# Patient Record
Sex: FEMALE | Race: BLACK OR AFRICAN AMERICAN | Employment: FULL TIME | URBAN - METROPOLITAN AREA
[De-identification: names, ages, dates, MRNs, and addresses within clinical notes are randomized per-mention and may not be internally consistent; named-entity substitution may affect disease eponyms.]

---

## 2017-02-13 ENCOUNTER — HOSPITAL ENCOUNTER (OUTPATIENT)
Dept: MAMMOGRAPHY | Age: 52
Discharge: HOME | End: 2017-02-13
Attending: OBSTETRICS & GYNECOLOGY
Payer: COMMERCIAL

## 2017-02-13 DIAGNOSIS — Z12.31 VISIT FOR SCREENING MAMMOGRAM: ICD-10-CM

## 2017-02-13 PROCEDURE — 77063 BREAST TOMOSYNTHESIS BI: CPT

## 2017-05-26 ENCOUNTER — HOSPITAL ENCOUNTER (OUTPATIENT)
Dept: PHYSICAL THERAPY | Age: 52
Discharge: HOME | End: 2017-05-26
Payer: COMMERCIAL

## 2017-05-26 PROCEDURE — 97110 THERAPEUTIC EXERCISES: CPT

## 2017-05-26 PROCEDURE — 97162 PT EVAL MOD COMPLEX 30 MIN: CPT
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
## 2017-06-01 ENCOUNTER — HOSPITAL ENCOUNTER (OUTPATIENT)
Dept: PHYSICAL THERAPY | Age: 52
Discharge: HOME | End: 2017-06-01
Payer: COMMERCIAL

## 2017-06-01 PROCEDURE — 97110 THERAPEUTIC EXERCISES: CPT

## 2017-06-01 PROCEDURE — 97112 NEUROMUSCULAR REEDUCATION: CPT

## 2017-06-01 NOTE — PROGRESS NOTES
PT DAILY TREATMENT NOTE 8-    Patient Name: Charline Hi  Date:2017  : 1965  [x]  Patient  Verified  Payor: Adam Streeter / Plan: Akhil Bonilla / Product Type: HMO /    In time: 4:22pm  Out time:5:06pm  Total Treatment Time (min): 44  Visit #: 2 of 8    Treatment Area: Low back pain [M54.5]    SUBJECTIVE  Pain Level (0-10 scale): 0 \"stiff\"  Any medication changes, allergies to medications, adverse drug reactions, diagnosis change, or new procedure performed?: [x] No    [] Yes (see summary sheet for update)  Subjective functional status/changes:   [] No changes reported  \"I feel better. I was doing those exercises and I could feel it loosen up the more I did it. I'm a little stiff now, but I just got off work. \"    OBJECTIVE  34 min Therapeutic Exercise:  [x] See flow sheet :   Rationale: increase ROM and increase strength to improve the patients ability to improve ease of ADLs. 10 min Neuromuscular Re-education:  [x]  See flow sheet :   Rationale: increase strength, improve coordination and increase proprioception  to improve the patients ability to improve core stability during daily activity. With   [] TE   [] TA   [] neuro   [] other: Patient Education: [x] Review HEP    [] Progressed/Changed HEP based on:   [] positioning   [] body mechanics   [] transfers   [] heat/ice application    [] other:      Other Objective/Functional Measures: requires initial tactile cues for PPT and cues to reduce valsalva maneuver, fair carryover post cueing     Pain Level (0-10 scale) post treatment: 0    ASSESSMENT/Changes in Function: Pt progressing reporting reduced pain since IE. Demonstrates fair TA activation post initial cueing. Reports fatigue with interventions. Continue per POC.     Patient will continue to benefit from skilled PT services to modify and progress therapeutic interventions, address functional mobility deficits, address ROM deficits, address strength deficits, analyze and address soft tissue restrictions, analyze and cue movement patterns, analyze and modify body mechanics/ergonomics and assess and modify postural abnormalities to attain remaining goals. []  See Plan of Care  []  See progress note/recertification  []  See Discharge Summary         Progress towards goals / Updated goals:  Short Term Goals: To be accomplished in 2 weeks:  1. Pt will be compliant with hourly motion program for 48 hours to alleviate fear/avoidance behavior and increase general mobility  2. Pt will be able to reach her toes from a sitting position to increase ease of bathing and dressing      Long Term Goals: To be accomplished in 4 weeks:  1. Pt will improve FOTO to 50 to increase ease of ADLs  2. Pt will demonstrate AROM of lumbar spine without pain to restore mobility for ADLs  3. Pt will report being able to reach and tie her shoes without fear or lumbar pain to increase ease of ADLs  4.  Pt will be able to walk around her workplace without limitations in endurance to increase ease of ADLs       PLAN  [x]  Upgrade activities as tolerated     [x]  Continue plan of care  []  Update interventions per flow sheet       []  Discharge due to:_  []  Other:_      Chris Randhawa, PT, DPT, ATC, CSCS 6/1/2017  4:31 PM

## 2017-06-02 ENCOUNTER — HOSPITAL ENCOUNTER (OUTPATIENT)
Dept: PHYSICAL THERAPY | Age: 52
Discharge: HOME | End: 2017-06-02
Payer: COMMERCIAL

## 2017-06-02 PROCEDURE — 97110 THERAPEUTIC EXERCISES: CPT

## 2017-06-02 PROCEDURE — 97112 NEUROMUSCULAR REEDUCATION: CPT

## 2017-06-02 NOTE — PROGRESS NOTES
PT DAILY TREATMENT NOTE     Patient Name: Kim Valentin  Date:2017  : 1965  [x]  Patient  Verified  Payor: Jaime Quevedo / Plan: Ephriam Haw / Product Type: HMO /    In time: 4:38pm  Out time:5:30pm  Total Treatment Time (min): 52  Visit #: 3 of 8    Treatment Area: Low back pain [M54.5]    SUBJECTIVE  Pain Level (0-10 scale): 2  Any medication changes, allergies to medications, adverse drug reactions, diagnosis change, or new procedure performed?: [x] No    [] Yes (see summary sheet for update)  Subjective functional status/changes:   [] No changes reported  Pt reports she was sore this morning, but did some exercises and that helped. She reports stiffness upon arrival, but attributes that to sitting at work all day. OBJECTIVE    32 min Therapeutic Exercise:  [x] See flow sheet :   Rationale: increase ROM and increase strength to improve the patients ability to improve ease of ADLs. 10 min Neuromuscular Re-education:  [x]  See flow sheet :   Rationale: increase strength, improve coordination and increase proprioception  to improve the patients ability to improve core stability during daily activity. Modality rationale: decrease pain and increase tissue extensibility to improve the patients ability to improve ADL ease.    Min Type Additional Details    [] Estim:  []Unatt       []IFC  []Premod                        []Other:  []w/ice   []w/heat  Position:  Location:    [] Estim: []Att    []TENS instruct  []NMES                    []Other:  []w/US   []w/ice   []w/heat  Position:  Location:    []  Traction: [] Cervical       []Lumbar                       [] Prone          []Supine                       []Intermittent   []Continuous Lbs:  [] before manual  [] after manual    []  Ultrasound: []Continuous   [] Pulsed                           []1MHz   []3MHz Location:  W/cm2:    []  Iontophoresis with dexamethasone         Location: [] Take home patch   [] In clinic   10 []  Ice     [x] heat  []  Ice massage  []  Laser   []  Anodyne Position: prone  Location:L/S    []  Laser with stim  []  Other: Position:  Location:    []  Vasopneumatic Device Pressure:       [] lo [] med [] hi   Temperature: [] lo [] med [] hi   [] Skin assessment post-treatment:  []intact []redness- no adverse reaction    []redness - adverse reaction:             With   [] TE   [] TA   [] neuro   [] other: Patient Education: [x] Review HEP    [] Progressed/Changed HEP based on:   [] positioning   [] body mechanics   [] transfers   [] heat/ice application    [x] other: educated pt on frequent positional changes and change of position from constant sitting posture      Other Objective/Functional Measures:      Pain Level (0-10 scale) post treatment: 0    ASSESSMENT/Changes in Function: Pt reports reducing pain and demonstrates grossly improved ease of thoracolumbar mobility demonstrating near ability to reach toes with trunk flexion in seated. Continue per POC with gradual progression into core stability and functional activity as tolerated. Patient will continue to benefit from skilled PT services to modify and progress therapeutic interventions, address functional mobility deficits, address ROM deficits, address strength deficits, analyze and address soft tissue restrictions, analyze and cue movement patterns and analyze and modify body mechanics/ergonomics to attain remaining goals. []  See Plan of Care  []  See progress note/recertification  []  See Discharge Summary         Progress towards goals / Updated goals:  Short Term Goals: To be accomplished in 2 weeks:  1. Pt will be compliant with hourly motion program for 48 hours to alleviate fear/avoidance behavior and increase general mobility Pt reports occasional performance, but limited by work 6/2/2017  2. Pt will be able to reach her toes from a sitting position to increase ease of bathing and dressing progressing 6/2/2017      Long Term Goals:  To be accomplished in 4 weeks:  1. Pt will improve FOTO to 50 to increase ease of ADLs  2. Pt will demonstrate AROM of lumbar spine without pain to restore mobility for ADLs  3. Pt will report being able to reach and tie her shoes without fear or lumbar pain to increase ease of ADLs  4.  Pt will be able to walk around her workplace without limitations in endurance to increase ease of ADLs    PLAN  [x]  Upgrade activities as tolerated     [x]  Continue plan of care  [x]  Update interventions per flow sheet       []  Discharge due to:_  []  Other:_      Chris Randhawa, PT, DPT, ATC, CSCS 6/2/2017  4:44 PM

## 2017-06-06 ENCOUNTER — HOSPITAL ENCOUNTER (OUTPATIENT)
Dept: PHYSICAL THERAPY | Age: 52
Discharge: HOME | End: 2017-06-06
Payer: COMMERCIAL

## 2017-06-06 PROCEDURE — 97110 THERAPEUTIC EXERCISES: CPT

## 2017-06-06 PROCEDURE — 97112 NEUROMUSCULAR REEDUCATION: CPT

## 2017-06-06 NOTE — PROGRESS NOTES
PT DAILY TREATMENT NOTE     Patient Name: Norma Jean  Date:2017  : 1965  [x]  Patient  Verified  Payor: Bryan Sigala / Plan: Dorena Hodgkins / Product Type: HMO /    In time:4:30  Out time:5:18  Total Treatment Time (min): 48  Visit #: 4 of 8    Treatment Area: Low back pain [M54.5]    SUBJECTIVE  Pain Level (0-10 scale): 0  Any medication changes, allergies to medications, adverse drug reactions, diagnosis change, or new procedure performed?: [x] No    [] Yes (see summary sheet for update)  Subjective functional status/changes:   [] No changes reported  Pt reports improved HEP tolerance and reduced pain today    OBJECTIVE    Modality rationale: increase tissue extensibility to improve the patients ability to improve functional mobility and ADL ease   Min Type Additional Details    [] Estim:  []Unatt       []IFC  []Premod                        []Other:  []w/ice   []w/heat  Position:  Location:    [] Estim: []Att    []TENS instruct  []NMES                    []Other:  []w/US   []w/ice   []w/heat  Position:  Location:    []  Traction: [] Cervical       []Lumbar                       [] Prone          []Supine                       []Intermittent   []Continuous Lbs:  [] before manual  [] after manual    []  Ultrasound: []Continuous   [] Pulsed                           []1MHz   []3MHz W/cm2:  Location:    []  Iontophoresis with dexamethasone         Location: [] Take home patch   [] In clinic   10 []  Ice     [x]  heat  []  Ice massage  []  Laser   []  Anodyne Position: prone  Location: L/S    []  Laser with stim  []  Other:  Position:  Location:    []  Vasopneumatic Device Pressure:       [] lo [] med [] hi   Temperature: [] lo [] med [] hi   [] Skin assessment post-treatment:  []intact []redness- no adverse reaction    []redness - adverse reaction:       23 min Therapeutic Exercise:  [] See flow sheet :   Rationale: increase ROM and increase strength to improve the patients ability to perform daily tasks and ADLs with increased ease     15 min Neuromuscular Re-education:  []  See flow sheet :   Rationale: improve coordination and increase proprioception  to improve the patients ability to activate core musculature with daily tasks for reduced back pain            With   [] TE   [] TA   [] neuro   [] other: Patient Education: [x] Review HEP    [] Progressed/Changed HEP based on:   [] positioning   [] body mechanics   [] transfers   [] heat/ice application    [] other:       Other Objective/Functional Measures: pt reports improved tolerance to PPT today    Added supine TA activation interventions today with good tolerance, report she can feel abs and glutes activating     Pain Level (0-10 scale) post treatment: 0    ASSESSMENT/Changes in Function: pt reports improved tolerance to exercise allowing her to perform with greater ease at home. Added core activation in supine today with good tolerance, pt reports good ab activation felt. Continue to progress as tolerated over remaining visits. Patient will continue to benefit from skilled PT services to modify and progress therapeutic interventions, address functional mobility deficits, address ROM deficits, address strength deficits, analyze and address soft tissue restrictions, analyze and cue movement patterns, analyze and modify body mechanics/ergonomics and assess and modify postural abnormalities to attain remaining goals. []  See Plan of Care  []  See progress note/recertification  []  See Discharge Summary         Progress towards goals / Updated goals:  Short Term Goals: To be accomplished in 2 weeks:  1. Pt will be compliant with hourly motion program for 48 hours to alleviate fear/avoidance behavior and increase general mobility Pt reports occasional performance, but limited by work 6/2/2017; pt reports improved consistency 6/6/17  2.  Pt will be able to reach her toes from a sitting position to increase ease of bathing and dressing progressing 6/2/2017; a couple of inches from toes 6/6/17      Long Term Goals: To be accomplished in 4 weeks:  1. Pt will improve FOTO to 50 to increase ease of ADLs  2. Pt will demonstrate AROM of lumbar spine without pain to restore mobility for ADLs  3. Pt will report being able to reach and tie her shoes without fear or lumbar pain to increase ease of ADLs  4.  Pt will be able to walk around her workplace without limitations in endurance to increase ease of ADLs    PLAN  [x]  Upgrade activities as tolerated     []  Continue plan of care  []  Update interventions per flow sheet       []  Discharge due to:_  []  Other:_      Josie Hancock, CHER 6/6/2017  4:40 PM    Future Appointments  Date Time Provider Jack Amin   6/8/2017 4:30 PM Liz Hunting, PT [facility]   6/13/2017 4:00 PM Liz Hunting, PT [facility]   6/16/2017 4:00 PM Liz Hunting, PT [facility]   6/20/2017 4:30 PM Liz Hunting, PT [facility]   6/22/2017 4:30 PM Liz Hunting, PT [facility]

## 2017-06-08 ENCOUNTER — HOSPITAL ENCOUNTER (OUTPATIENT)
Dept: PHYSICAL THERAPY | Age: 52
Discharge: HOME | End: 2017-06-08
Payer: COMMERCIAL

## 2017-06-08 PROCEDURE — 97112 NEUROMUSCULAR REEDUCATION: CPT

## 2017-06-08 PROCEDURE — 97110 THERAPEUTIC EXERCISES: CPT

## 2017-06-08 NOTE — PROGRESS NOTES
PT DAILY TREATMENT NOTE 3-16    Patient Name: Jair Valencia  Date:2017  : 1965  [x]  Patient  Verified  Payor: [place]ris [place] / Plan: [employer] / Product Type: HMO /    In time:  Out time:171  Total Treatment Time (min): 43  Visit #: 5 of 8    Treatment Area: Low back pain [M54.5]    SUBJECTIVE  Pain Level (0-10 scale): 0  Any medication changes, allergies to medications, adverse drug reactions, diagnosis change, or new procedure performed?: [x] No    [] Yes (see summary sheet for update)  Subjective functional status/changes:   [] No changes reported  \"Feels good. \"    OBJECTIVE     min []Eval                  []Re-Eval     10 min Therapeutic Exercise:  [x] See flow sheet :   Rationale: increase ROM and increase strength to improve the patients ability to perform ADLs    33 min Neuromuscular Re-education:  [x]  See flow sheet :   Rationale: increase strength, improve coordination and increase proprioception  to improve the patients ability to improve stability            With   [] TE   [] TA   [] neuro   [] other: Patient Education: [x] Review HEP    [] Progressed/Changed HEP based on:   [] positioning   [] body mechanics   [] transfers   [] heat/ice application    [] other:      Other Objective/Functional Measures: added head lift to SKC/DKC (with towel) with cueing for core and deep cervical flexor engagement     Pain Level (0-10 scale) post treatment: 0    ASSESSMENT/Changes in Function:   Pt reports feeling her core with all exercises today, and that she was sore afterward, but not in pain. Pt demonstrates excellent understanding of the difference between pain and soreness. Will continue to progress exercises as able, and pt understands how to manage soreness.     Patient will continue to benefit from skilled PT services to modify and progress therapeutic interventions, address functional mobility deficits, address ROM deficits, address strength deficits, analyze and address soft tissue restrictions, analyze and cue movement patterns, analyze and modify body mechanics/ergonomics, assess and modify postural abnormalities and instruct in home and community integration to attain remaining goals. []  See Plan of Care  []  See progress note/recertification  []  See Discharge Summary         Progress towards goals / Updated goals:  Short Term Goals: To be accomplished in 2 weeks:  1. Pt will be compliant with hourly motion program for 48 hours to alleviate fear/avoidance behavior and increase general mobility Pt reports occasional performance, but limited by work 6/2/2017; pt reports improved consistency 6/6/17  2. Pt will be able to reach her toes from a sitting position to increase ease of bathing and dressing progressing 6/2/2017; a couple of inches from toes 6/6/17      Long Term Goals: To be accomplished in 4 weeks:  1. Pt will improve FOTO to 50 to increase ease of ADLs  2. Pt will demonstrate AROM of lumbar spine without pain to restore mobility for ADLs  3. Pt will report being able to reach and tie her shoes without fear or lumbar pain to increase ease of ADLs  4.  Pt will be able to walk around her workplace without limitations in endurance to increase ease of ADLs    PLAN  [x]  Upgrade activities as tolerated     [x]  Continue plan of care  [x]  Update interventions per flow sheet       []  Discharge due to:_  []  Other:_      Wolf Keith PT 6/8/2017  4:40 PM    Future Appointments  Date Time Provider Jack Amin   6/13/2017 4:00 PM Wolf Keith PT [facility]   6/16/2017 4:00 PM Wolf Keith PT [facility]   6/20/2017 4:30 PM Wolf Keith PT [facility]   6/22/2017 4:30 PM Wolf Keith PT [facility]

## 2017-06-13 ENCOUNTER — HOSPITAL ENCOUNTER (OUTPATIENT)
Dept: PHYSICAL THERAPY | Age: 52
Discharge: HOME | End: 2017-06-13
Payer: COMMERCIAL

## 2017-06-13 PROCEDURE — 97112 NEUROMUSCULAR REEDUCATION: CPT

## 2017-06-13 PROCEDURE — 97110 THERAPEUTIC EXERCISES: CPT

## 2017-06-13 NOTE — PROGRESS NOTES
PT DAILY TREATMENT NOTE 3-16    Patient Name: Mauricio Dominguez  Date:2017  : 1965  [x]  Patient  Verified  Payor: Yandel Otto / Plan: Seth Shape / Product Type: HMO /    In time:1600  Out time:1700  Total Treatment Time (min): 60 (minus 10 minutes down time = 50 minutes)  Visit #: 6 of 8    Treatment Area: Low back pain [M54.5]    SUBJECTIVE  Pain Level (0-10 scale): 0  Any medication changes, allergies to medications, adverse drug reactions, diagnosis change, or new procedure performed?: [x] No    [] Yes (see summary sheet for update)  Subjective functional status/changes:   [] No changes reported  Pt reports feeling great after last visit, with no lasting soreness. She has been practicing her exercises at home. OBJECTIVE     min []Eval                  []Re-Eval     25 min Therapeutic Exercise:  [x] See flow sheet :   Rationale: increase ROM and increase strength to improve the patients ability to perform ADLs    25 min Neuromuscular Re-education:  [x]  See flow sheet :   Rationale: increase strength, improve coordination and increase proprioception  to improve the patients ability to improve core engagement            With   [] TE   [] TA   [] neuro   [] other: Patient Education: [x] Review HEP    [] Progressed/Changed HEP based on:   [] positioning   [] body mechanics   [] transfers   [] heat/ice application    [] other:      Other Objective/Functional Measures:   HEP - every two hours  FOTO 79     Pain Level (0-10 scale) post treatment: 0    ASSESSMENT/Changes in Function:   Pt demonstrates improved FOTO to 79/100, and improved mobility and functional task completion. She reports continued fatigue and soreness with core stability exercises, however able to perform more reps at a time before taking a rest break. Will progress toward standing stability exercises to improve lifting and stair negotiation.     Patient will continue to benefit from skilled PT services to modify and progress therapeutic interventions, address functional mobility deficits, address ROM deficits, address strength deficits, analyze and address soft tissue restrictions, analyze and cue movement patterns, analyze and modify body mechanics/ergonomics, assess and modify postural abnormalities and instruct in home and community integration to attain remaining goals. []  See Plan of Care  []  See progress note/recertification  []  See Discharge Summary         Progress towards goals / Updated goals:  Short Term Goals: To be accomplished in 2 weeks:  1. Pt will be compliant with hourly motion program for 48 hours to alleviate fear/avoidance behavior and increase general mobility Pt reports occasional performance, but limited by work 6/2/2017; pt reports improved consistency 6/6/17; met for every two hours based on work schedule 06/13/2017  2. Pt will be able to reach her toes from a sitting position to increase ease of bathing and dressing progressing 6/2/2017; a couple of inches from toes 6/6/17      Long Term Goals: To be accomplished in 4 weeks:  1. Pt will improve FOTO to 50 to increase ease of ADLs. Met at 79/100 06/13/2017  2. Pt will demonstrate AROM of lumbar spine without pain to restore mobility for ADLs  3. Pt will report being able to reach and tie her shoes without fear or lumbar pain to increase ease of ADLs  4.  Pt will be able to walk around her workplace without limitations in endurance to increase ease of ADLs    PLAN  [x]  Upgrade activities as tolerated     [x]  Continue plan of care  [x]  Update interventions per flow sheet       []  Discharge due to:_  []  Other:_      Leslie Aparicio PT 6/13/2017  3:47 PM    Future Appointments  Date Time Provider Jack Amin   6/13/2017 4:00 PM Leslie Aparicio PT [facility]   6/16/2017 4:00 PM Leslie Aparicio PT [facility]   6/20/2017 4:30 PM Leslie Aparicio PT [facility]   6/22/2017 4:30 PM Leslie Aparicio PT [facility]

## 2017-06-16 ENCOUNTER — APPOINTMENT (OUTPATIENT)
Dept: PHYSICAL THERAPY | Age: 52
End: 2017-06-16
Payer: COMMERCIAL

## 2017-06-20 ENCOUNTER — HOSPITAL ENCOUNTER (OUTPATIENT)
Dept: PHYSICAL THERAPY | Age: 52
Discharge: HOME | End: 2017-06-20
Payer: COMMERCIAL

## 2017-06-20 PROCEDURE — 97112 NEUROMUSCULAR REEDUCATION: CPT

## 2017-06-20 PROCEDURE — 97110 THERAPEUTIC EXERCISES: CPT

## 2017-06-20 NOTE — PROGRESS NOTES
PT DAILY TREATMENT NOTE 3-16    Patient Name: Fanny Lopez  Date:2017  : 1965  [x]  Patient  Verified  Payor: Sandra Rm / Plan: Chandler Dakins / Product Type: HMO /    In time:  Out time:  Total Treatment Time (min): 44  Visit #: 7 of 8    Treatment Area: Low back pain [M54.5]    SUBJECTIVE  Pain Level (0-10 scale): 0  Any medication changes, allergies to medications, adverse drug reactions, diagnosis change, or new procedure performed?: [x] No    [] Yes (see summary sheet for update)  Subjective functional status/changes:   [] No changes reported  Pt reports hitting traffic last Friday, and that's why she missed her appointment. She reports forgetting her core stability exercises over the weekend. OBJECTIVE     min []Eval                  []Re-Eval     10 min Therapeutic Exercise:  [x] See flow sheet :   Rationale: increase ROM and increase strength to improve the patients ability to perform ADLs    34 min Neuromuscular Re-education:  [x]  See flow sheet :   Rationale: increase strength, improve coordination and increase proprioception  to improve the patients ability to improve core engagement. With   [] TE   [] TA   [] neuro   [] other: Patient Education: [x] Review HEP    [] Progressed/Changed HEP based on:   [] positioning   [] body mechanics   [] transfers   [] heat/ice application    [] other:      Other Objective/Functional Measures:   Reach and tie her shoes: no pain     Pain Level (0-10 scale) post treatment: 0    ASSESSMENT/Changes in Function:   Pt demonstrates much improved ability to perform core stability exercises, and challenged further with B UE movement with TA draw. Pt reports having no pain when performing work tasks. She is to assess her overall functional status and decide if we will perform continued PT after next visit or not.     Patient will continue to benefit from skilled PT services to modify and progress therapeutic interventions, address functional mobility deficits, address ROM deficits, address strength deficits, analyze and address soft tissue restrictions, analyze and cue movement patterns, analyze and modify body mechanics/ergonomics, assess and modify postural abnormalities and instruct in home and community integration to attain remaining goals. []  See Plan of Care  []  See progress note/recertification  []  See Discharge Summary         Progress towards goals / Updated goals:  Short Term Goals: To be accomplished in 2 weeks:  1. Pt will be compliant with hourly motion program for 48 hours to alleviate fear/avoidance behavior and increase general mobility Pt reports occasional performance, but limited by work 6/2/2017; pt reports improved consistency 6/6/17; met for every two hours based on work schedule 06/13/2017  2. Pt will be able to reach her toes from a sitting position to increase ease of bathing and dressing progressing 6/2/2017; a couple of inches from toes 6/6/17      Long Term Goals: To be accomplished in 4 weeks:  1. Pt will improve FOTO to 50 to increase ease of ADLs. Met at 79/100 06/13/2017  2. Pt will demonstrate AROM of lumbar spine without pain to restore mobility for ADLs  3. Pt will report being able to reach and tie her shoes without fear or lumbar pain to increase ease of ADLs. No pain 06/20/2017  4. Pt will be able to walk around her workplace without limitations in endurance to increase ease of ADLs.  Met 06/20/2017    PLAN  [x]  Upgrade activities as tolerated     [x]  Continue plan of care  [x]  Update interventions per flow sheet       []  Discharge due to:_  []  Other:_      Dane Keys PT 6/20/2017  4:58 PM    Future Appointments  Date Time Provider Jack Amin   6/22/2017 4:30 PM Dane Keys PT [facility]   6/28/2017 7:00 AM [facility]

## 2017-06-22 ENCOUNTER — HOSPITAL ENCOUNTER (OUTPATIENT)
Dept: PHYSICAL THERAPY | Age: 52
Discharge: HOME | End: 2017-06-22
Payer: COMMERCIAL

## 2017-06-22 PROCEDURE — 97112 NEUROMUSCULAR REEDUCATION: CPT

## 2017-06-22 PROCEDURE — 97110 THERAPEUTIC EXERCISES: CPT

## 2017-06-22 NOTE — PROGRESS NOTES
PT DAILY TREATMENT NOTE 3-16    Patient Name: Juan Meier  Date:2017  : 1965  [x]  Patient  Verified  Payor: Ramiro Castillo / Plan: Urban Son / Product Type: HMO /    In time:  Out time:171  Total Treatment Time (min): 55  Visit #: 8 of 8    Treatment Area: Low back pain [M54.5]    SUBJECTIVE  Pain Level (0-10 scale): 0  Any medication changes, allergies to medications, adverse drug reactions, diagnosis change, or new procedure performed?: [x] No    [] Yes (see summary sheet for update)  Subjective functional status/changes:   [] No changes reported  Pt reports ready to d/c. OBJECTIVE     min []Eval                  []Re-Eval     10 min Therapeutic Exercise:  [x] See flow sheet :   Rationale: increase ROM and increase strength to improve the patients ability to perform ADLs    36 min Neuromuscular Re-education:  [x]  See flow sheet :   Rationale: increase strength, improve coordination and increase proprioception  to improve the patients ability to improve core engagement            With   [] TE   [] TA   [] neuro   [] other: Patient Education: [x] Review HEP    [] Progressed/Changed HEP based on:   [] positioning   [] body mechanics   [] transfers   [] heat/ice application    [] other:      Other Objective/Functional Measures: full lumbar AROM in standing, back is not a restrictor for forward flex in sitting     Pain Level (0-10 scale) post treatment: 0    ASSESSMENT/Changes in Function:   Pt demonstrates excellent progress with PT. Pt demonstrates full lumbar AROM, and she is able to perform all core exercises at home. Updated HEP, and pt d/c to independent HEP at this time. []  See Plan of Care  []  See progress note/recertification  [x]  See Discharge Summary         Progress towards goals / Updated goals:  Short Term Goals: To be accomplished in 2 weeks:  1.  Pt will be compliant with hourly motion program for 48 hours to alleviate fear/avoidance behavior and increase general mobility Pt reports occasional performance, but limited by work 6/2/2017; pt reports improved consistency 6/6/17; met for every two hours based on work schedule 06/13/2017  2. Pt will be able to reach her toes from a sitting position to increase ease of bathing and dressing progressing 6/2/2017; a couple of inches from toes 6/6/17; unable to reach past mid shin, however the back is not the restricting factor 06/22/2017      Long Term Goals: To be accomplished in 4 weeks:  1. Pt will improve FOTO to 50 to increase ease of ADLs. Met at 79/100 06/13/2017  2. Pt will demonstrate AROM of lumbar spine without pain to restore mobility for ADLs. met 06/22/2017  3. Pt will report being able to reach and tie her shoes without fear or lumbar pain to increase ease of ADLs. No pain 06/20/2017  4. Pt will be able to walk around her workplace without limitations in endurance to increase ease of ADLs.  Met 06/20/2017    PLAN  []  Upgrade activities as tolerated     []  Continue plan of care  []  Update interventions per flow sheet       [x]  Discharge due to: completion of program.  []  Other:_      Trey Campa PT 6/22/2017  4:55 PM    Future Appointments  Date Time Provider Jack Amin   6/28/2017 7:00 AM Trey Campa PT [facility]

## 2017-06-22 NOTE — PROGRESS NOTES
[facility]  [address] [facility] [address]  [place], [address]  [phone] fax    Physical Therapy Discharge Summary  Patient name: Stephanie Jeter Start of Care: 2017   Referral source: Yee Gilliam MD : 1965                          Medical Diagnosis: Low back pain [M54.5] Onset Date:2017                          Treatment Diagnosis: dorsal lumbar strain   Prior Hospitalization: see medical history Provider#: [id number]   Medications: Verified on Patient summary List    Comorbidities: back pain, obesity, HA, HTN   Prior Level of Function: Full ROM, no muscle guarding or limitations to sitting/standing tolerance    Visits from Start of Care: 8    Missed Visits: 1 cx due to weather  Reporting Period : 2017 to 2017      Summary of Care:  Short Term Goals: To be accomplished in 2 weeks:  1. Pt will be compliant with hourly motion program for 48 hours to alleviate fear/avoidance behavior and increase general mobility Pt reports occasional performance, but limited by work 2017; pt reports improved consistency 17; met for every two hours based on work schedule 2017  2. Pt will be able to reach her toes from a sitting position to increase ease of bathing and dressing progressing 2017; a couple of inches from toes 17; unable to reach past mid shin, however the back is not the restricting factor 2017      Long Term Goals: To be accomplished in 4 weeks:  1. Pt will improve FOTO to 50 to increase ease of ADLs. Met at 79/100 2017  2. Pt will demonstrate AROM of lumbar spine without pain to restore mobility for ADLs. met 2017  3. Pt will report being able to reach and tie her shoes without fear or lumbar pain to increase ease of ADLs. No pain 2017  4. Pt will be able to walk around her workplace without limitations in endurance to increase ease of ADLs.  Met 2017      ASSESSMENT/RECOMMENDATIONS: Pt demonstrates excellent progress with PT. Pt demonstrates full lumbar AROM, and she is able to perform all core exercises at home. Updated HEP, and pt d/c to independent HEP at this time.   [x]Discontinue therapy: [x]Patient has reached or is progressing toward set goals      []Patient is non-compliant or has abdicated      []Due to lack of appreciable progress towards set goals    Liz Mitchell PT 6/22/2017 5:33 PM

## 2017-06-28 ENCOUNTER — APPOINTMENT (OUTPATIENT)
Dept: PHYSICAL THERAPY | Age: 52
End: 2017-06-28
Payer: COMMERCIAL

## 2018-04-20 ENCOUNTER — OFFICE VISIT (OUTPATIENT)
Dept: FAMILY MEDICINE CLINIC | Age: 53
End: 2018-04-20

## 2018-04-20 VITALS
WEIGHT: 209 LBS | SYSTOLIC BLOOD PRESSURE: 143 MMHG | HEIGHT: 61 IN | OXYGEN SATURATION: 99 % | BODY MASS INDEX: 39.46 KG/M2 | TEMPERATURE: 97.1 F | HEART RATE: 91 BPM | RESPIRATION RATE: 18 BRPM | DIASTOLIC BLOOD PRESSURE: 92 MMHG

## 2018-04-20 DIAGNOSIS — Z12.11 COLON CANCER SCREENING: ICD-10-CM

## 2018-04-20 DIAGNOSIS — Z83.3 FAMILY HISTORY OF DIABETES MELLITUS (DM): ICD-10-CM

## 2018-04-20 DIAGNOSIS — E66.01 MORBID OBESITY (HCC): ICD-10-CM

## 2018-04-20 DIAGNOSIS — I10 ESSENTIAL HYPERTENSION: ICD-10-CM

## 2018-04-20 DIAGNOSIS — I10 ESSENTIAL HYPERTENSION: Primary | ICD-10-CM

## 2018-04-20 RX ORDER — AMLODIPINE BESYLATE 5 MG/1
5 TABLET ORAL DAILY
Qty: 30 TAB | Refills: 2 | Status: SHIPPED | OUTPATIENT
Start: 2018-04-20 | End: 2018-06-06 | Stop reason: SDUPTHER

## 2018-04-20 RX ORDER — CHOLECALCIFEROL (VITAMIN D3) 125 MCG
1 CAPSULE ORAL DAILY
COMMUNITY
End: 2019-05-22 | Stop reason: DRUGHIGH

## 2018-04-20 NOTE — PATIENT INSTRUCTIONS
Return to give blood after fasting 8 hours. No food or coffee. Water and medicines are okay. Lab opens from 8:30AM to 4:30PM Monday through Friday. No need to schedule an appointment. Bring stool blood test for colon cancer screening back when you get blood work done        FOR HIGH BLOOD PRESSURE:  Start taking amlodipine 5 mg daily in morning  Check blood pressure mid-day 2-3  x per week and write down  Make sure you are seated for at least  5-10 minutes, legs uncrossed and back resting  Bring log to clinic next visit  Max salt per day 2 grams (2000 mg)  Work on exercise 30 minutes a day 3 times per week    FOR WEIGHT:  Exercise per above  Try to eat 3 meals a day every day, healthy protien healthy fat and healthy carbohydrate with each meal, diet recs below  Try to drink at least 2 liters of water per day           Learning About Low-Carbohydrate Diets for Weight Loss  What is a low-carbohydrate diet? Low-carb diets avoid foods that are high in carbohydrate. These high-carb foods include pasta, bread, rice, cereal, fruits, and starchy vegetables. Instead, these diets usually have you eat foods that are high in fat and protein. Many people lose weight quickly on a low-carb diet. But the early weight loss is water. People on this diet often gain the weight back after they start eating carbs again. Not all diet plans are safe or work well. A lot of the evidence shows that low-carb diets aren't healthy. That's because these diets often don't include healthy foods like fruits and vegetables. Losing weight safely means balancing protein, fat, and carbs with every meal and snack. And low-carb diets don't always provide the vitamins, minerals, and fiber you need. If you have a serious medical condition, talk to your doctor before you try any diet. These conditions include kidney disease, heart disease, type 2 diabetes, high cholesterol, and high blood pressure.   If you are pregnant, it may not be safe for your baby if you are on a low-carb diet. How can you lose weight safely? You might have heard that a diet plan helped another person lose weight. But that doesn't mean that it will work for you. It is very hard to stay on a diet that includes lots of big changes in your eating habits. If you want to get to a healthy weight and stay there, making healthy lifestyle changes will often work better than dieting. These steps can help. · Make a plan for change. Work with your doctor to create a plan that is right for you. · See a dietitian. He or she can show you how to make healthy changes in your eating habits. · Manage stress. If you have a lot of stress in your life, it can be hard to focus on making healthy changes to your daily habits. · Track your food and activity. You are likely to do better at losing weight if you keep track of what you eat and what you do. Follow-up care is a key part of your treatment and safety. Be sure to make and go to all appointments, and call your doctor if you are having problems. It's also a good idea to know your test results and keep a list of the medicines you take. Where can you learn more? Go to http://mahesh-mindy.info/. Enter A121 in the search box to learn more about \"Learning About Low-Carbohydrate Diets for Weight Loss. \"  Current as of: May 12, 2017  Content Version: 11.4  © 2604-0201 [employer]. Care instructions adapted under license by [employer] (which disclaims liability or warranty for this information). If you have questions about a medical condition or this instruction, always ask your healthcare professional. [address] any warranty or liability for your use of this information. Body Mass Index: Care Instructions  Your Care Instructions    Body mass index (BMI) can help you see if your weight is raising your risk for health problems.  It uses a formula to compare how much you weigh with how tall you are. · A BMI lower than 18.5 is considered underweight. · A BMI between 18.5 and 24.9 is considered healthy. · A BMI between 25 and 29.9 is considered overweight. A BMI of 30 or higher is considered obese. If your BMI is in the normal range, it means that you have a lower risk for weight-related health problems. If your BMI is in the overweight or obese range, you may be at increased risk for weight-related health problems, such as high blood pressure, heart disease, stroke, arthritis or joint pain, and diabetes. If your BMI is in the underweight range, you may be at increased risk for health problems such as fatigue, lower protection (immunity) against illness, muscle loss, bone loss, hair loss, and hormone problems. BMI is just one measure of your risk for weight-related health problems. You may be at higher risk for health problems if you are not active, you eat an unhealthy diet, or you drink too much alcohol or use tobacco products. Follow-up care is a key part of your treatment and safety. Be sure to make and go to all appointments, and call your doctor if you are having problems. It's also a good idea to know your test results and keep a list of the medicines you take. How can you care for yourself at home? · Practice healthy eating habits. This includes eating plenty of fruits, vegetables, whole grains, lean protein, and low-fat dairy. · If your doctor recommends it, get more exercise. Walking is a good choice. Bit by bit, increase the amount you walk every day. Try for at least 30 minutes on most days of the week. · Do not smoke. Smoking can increase your risk for health problems. If you need help quitting, talk to your doctor about stop-smoking programs and medicines. These can increase your chances of quitting for good. · Limit alcohol to 2 drinks a day for men and 1 drink a day for women. Too much alcohol can cause health problems.   If you have a BMI higher than 25  · Your doctor may do other tests to check your risk for weight-related health problems. This may include measuring the distance around your waist. A waist measurement of more than 40 inches in men or 35 inches in women can increase the risk of weight-related health problems. · Talk with your doctor about steps you can take to stay healthy or improve your health. You may need to make lifestyle changes to lose weight and stay healthy, such as changing your diet and getting regular exercise. If you have a BMI lower than 18.5  · Your doctor may do other tests to check your risk for health problems. · Talk with your doctor about steps you can take to stay healthy or improve your health. You may need to make lifestyle changes to gain or maintain weight and stay healthy, such as getting more healthy foods in your diet and doing exercises to build muscle. Where can you learn more? Go to http://mahesh-mindy.info/. Enter S176 in the search box to learn more about \"Body Mass Index: Care Instructions. \"  Current as of: October 13, 2016  Content Version: 11.4  © 3309-2815 [employer]. Care instructions adapted under license by [employer] (which disclaims liability or warranty for this information). If you have questions about a medical condition or this instruction, always ask your healthcare professional. [address] any warranty or liability for your use of this information.

## 2018-04-20 NOTE — PROGRESS NOTES
INTERNAL MEDICINE INITIAL PROVIDER VISIT    SUBJECTIVE:     Chief Complaint   Patient presents with    Establish Care    Hypertension       HPI: 47 y.o. female with PMHx significant for HTN, obesity is here for the above chief complaint(s). Establish Care: [facility] last visit Jan 2018, seeing since last year. Last blood work last year. Hypertension: Today BP is uncontrolled. No past medicines. BP checks when \"feel that it's elevated\" or \"with a headache\" levels 160/94 highest. Denies headache, lightheadedness, dizziness, vision changes, chest pain, rapid/irregular heart rate, shortness of breath, cough, abdominal pain, leg swelling, leg pain. Is trying to watch salt in diet. Eating \"a lot of processed foods. \" Exericse 2x per week. Limited. Eating 3 meals a day. 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 2 grandchildren as well. Obesity: Exercising 2x per week for past year. Was exercising more, decreased sugar intake and exercising 4-5 x per week. ROS: 12 point ROS completed, positive per HPI    Current Outpatient Prescriptions   Medication Sig    MULTIVIT-MIN/FOLIC [id number] (ALIVE WOMEN'S GUMMY VITAMINS PO) Take  by mouth.  cholecalciferol, vitamin D3, (VITAMIN D3) 2,000 unit tab Take  by mouth.  amLODIPine (NORVASC) 5 mg tablet Take 1 Tab by mouth daily for 90 days. No current facility-administered medications for this visit.       Health Maintenance   Topic Date Due    DTaP/Tdap/Td series (1 - Tdap) 05/07/1987    Influenza Age 5 to Adult  08/01/2017    BREAST CANCER SCRN MAMMOGRAM  04/22/2018    FOBT Q 1 YEAR AGE 50-75  07/19/2018 (Originally 5/7/2016)    PAP AKA CERVICAL CYTOLOGY  04/01/2021       Medications and Allergies: Reviewed and confirmed in the chart    Past Medical Hx: Reviewed and confirmed in the chart    Patient Active Problem List   Diagnosis Code    Morbid obesity ([facility] [address] E66.01    Hypertension I10    Family history of diabetes mellitus (DM) Z83.3     Family Hx, Surgical Hx, Social Hx: Reviewed and updated in EMR    OBJECTIVE:  Vitals:    04/20/18 1114 04/20/18 1126   BP: (!) 167/94 (!) 143/92   Pulse: 91    Resp: 18    Temp: 97.1 °F (36.2 °C)    TempSrc: Oral    SpO2: 99%    Weight: 209 lb (94.8 kg)    Height: 5' 1\" (1.549 m)        BP Readings from Last 3 Encounters:   04/20/18 (!) 143/92   10/19/12 126/83     Wt Readings from Last 3 Encounters:   04/20/18 209 lb (94.8 kg)   10/19/12 181 lb 3.5 oz (82.2 kg)       General: Pleasant adult middle aged woman sitting comfortably in no acute distress  HEENT: Head is atraumatic normo-cephalic. CVS:Heart regular, rate, and rhythm. Audible S1 and S2. No murmurs, rubs or gallops. PULM: Lungs clear to auscultation bilaterally. No wheezes, rales or rhonchi. EXT: 2+ dorsalis pedis pulses bilaterally. No pedal edema bilaterally  Neuro: Alert and oriented x3. Gait wnl. MSE: mood euthymic, affect congruent and reactive. Nursing Notes Reviewed    ASSESSMENT AND PLAN    ICD-10-CM ICD-9-CM    1. Essential hypertension I10 401.9 LIPID PANEL      HEMOGLOBIN A1C WITH EAG      METABOLIC PANEL, COMPREHENSIVE      TSH 3RD GENERATION      CBC WITH AUTOMATED DIFF      amLODIPine (NORVASC) 5 mg tablet  BP LOG  EXERCISE  LOW SALT DIET   2. Morbid obesity ([address] E66.01 278.01 LIPID PANEL      HEMOGLOBIN A1C WITH EAG      METABOLIC PANEL, COMPREHENSIVE      TSH 3RD GENERATION      CBC WITH AUTOMATED DIFF  DIET AND EXERCISE DISCUSSED  BMI Handout given   3. Family history of diabetes mellitus (DM) Z83.3 V18.0 HEMOGLOBIN A1C WITH EAG   4.  Colon cancer screening Z12.11 V76.51 OCCULT BLOOD, IMMUNOASSAY (FIT)       Orders Placed This Encounter    LIPID PANEL    HEMOGLOBIN A1C WITH EAG    METABOLIC PANEL, COMPREHENSIVE    TSH 3RD GENERATION    CBC WITH AUTOMATED DIFF    OCCULT BLOOD, IMMUNOASSAY (FIT)    MULTIVIT-MIN/FOLIC UCAB/[id number] (ALIVE WOMEN'S GUMMY VITAMINS PO)    cholecalciferol, vitamin D3, (VITAMIN D3) 2,000 unit tab    amLODIPine (NORVASC) 5 mg tablet       Future Appointments  Date Time Provider Department Center   5/18/2018 2:30 PM Hernandez Fernandez MD [address] [place] [address] printed and provided to patient    Assessment and plan above discussed with patient, patient voiced understanding and agreement with plan. More than 50% of this 30 min visit was spent face to face counseling the patient about the etiology and treatment options for the above health conditions outlined in assessment and plan       Hernandez Fernandez M.D.   [address], [address] [place] [address] [phone]  [place] [phone]

## 2018-04-20 NOTE — PROGRESS NOTES
Chief Complaint   Patient presents with    Establish Care    Hypertension     1. Have you been to the ER, urgent care clinic since your last visit? Hospitalized since your last visit? Yes [facility]     2. Have you seen or consulted any other health care providers outside of the [address] [place] [address] since your last visit? Include any pap smears or colon screening.  Yes Where: [facility]

## 2018-04-20 NOTE — MR AVS SNAPSHOT
Arlyss Drought 
 
 
 [address] [facility] [address] [address] 
[phone] Patient: Paris Campbell MRN: [id number] [facility]:0/2/4597 Visit Information Date & Time Provider Department Dept. Phone Encounter #  
 4/20/2018 11:00 AM Nena Naranjo MD [address] [phone] [id number] Follow-up Instructions Return in about 4 weeks (around 5/18/2018), or if symptoms worsen or fail to improve, for HTN, Obesity. Upcoming Health Maintenance Date Due DTaP/Tdap/Td series (1 - Tdap) 5/7/1987 FOBT Q 1 YEAR AGE 50-75 5/7/2016 PAP AKA CERVICAL CYTOLOGY 6/3/2016 Influenza Age 5 to Adult 8/1/2017 BREAST CANCER SCRN MAMMOGRAM 2/13/2019 Allergies as of 4/20/2018  Review Complete On: 4/20/2018 By: Nena Naranjo MD  
  
 Severity Noted Reaction Type Reactions Citric Acid  04/20/2018    Rash Red Dye  04/20/2018    Hives Current Immunizations  Never Reviewed No immunizations on file. Not reviewed this visit You Were Diagnosed With   
  
 Codes Comments Essential hypertension    -  Primary ICD-10-CM: I10 
ICD-9-CM: 401.9 Morbid obesity ([facility] [address]     ICD-10-CM: E66.01 
ICD-9-CM: 278.01 Family history of diabetes mellitus (DM)     ICD-10-CM: Z83.3 ICD-9-CM: V18.0 Colon cancer screening     ICD-10-CM: Z12.11 ICD-9-CM: V76.51 Vitals BP Pulse Temp Resp Height(growth percentile) Weight(growth percentile) (!) 143/92 91 97.1 °F (36.2 °C) (Oral) 18 5' 1\" (1.549 m) 209 lb (94.8 kg) LMP SpO2 BMI OB Status Smoking Status 04/09/2018 99% 39.49 kg/m2 Having regular periods Never Smoker Vitals History BMI and BSA Data Body Mass Index Body Surface Area  
 39.49 kg/m 2 2.02 m 2 Preferred Pharmacy Pharmacy Name Phone [id number] [phone] Your Updated Medication List  
  
   
 This list is accurate as of 4/20/18 11:45 AM.  Always use your most recent med list.  
  
  
  
  
 ALIVE WOMEN'S GUMMY VITAMINS PO Take  by mouth. amLODIPine 5 mg tablet Commonly known as:  George Oneil Take 1 Tab by mouth daily for 90 days. VITAMIN D3 2,000 unit Tab Generic drug:  cholecalciferol (vitamin D3) Take  by mouth. Prescriptions Sent to Pharmacy Refills  
 amLODIPine (NORVASC) 5 mg tablet 2 Sig: Take 1 Tab by mouth daily for 90 days. Class: Normal  
 Pharmacy: [address] [place] [address] #: [phone] Route: Oral  
  
Follow-up Instructions Return in about 4 weeks (around 5/18/2018), or if symptoms worsen or fail to improve, for HTN, Obesity. To-Do List   
 04/20/2018 Lab:  CBC WITH AUTOMATED DIFF   
  
 04/20/2018 Lab:  HEMOGLOBIN A1C WITH EAG   
  
 04/20/2018 Lab:  LIPID PANEL   
  
 04/20/2018 Lab:  METABOLIC PANEL, COMPREHENSIVE   
  
 04/20/2018 Lab:  TSH 3RD GENERATION   
  
 05/20/2018 Lab:  OCCULT BLOOD, IMMUNOASSAY (FIT) Patient Instructions Return to give blood after fasting 8 hours. No food or coffee. Water and medicines are okay. Lab opens from 8:30AM to 4:30PM Monday through Friday. No need to schedule an appointment. Bring stool blood test for colon cancer screening back when you get blood work done FOR HIGH BLOOD PRESSURE: 
Start taking amlodipine 5 mg daily in morning Check blood pressure mid-day 2-3  x per week and write down Make sure you are seated for at least  5-10 minutes, legs uncrossed and back resting Bring log to clinic next visit Max salt per day 2 grams (2000 mg) Work on exercise 30 minutes a day 3 times per week FOR WEIGHT: 
Exercise per above Try to eat 3 meals a day every day, healthy protien healthy fat and healthy carbohydrate with each meal, diet recs below Try to drink at least 2 liters of water per day Learning About Low-Carbohydrate Diets for Weight Loss What is a low-carbohydrate diet? Low-carb diets avoid foods that are high in carbohydrate. These high-carb foods include pasta, bread, rice, cereal, fruits, and starchy vegetables. Instead, these diets usually have you eat foods that are high in fat and protein. Many people lose weight quickly on a low-carb diet. But the early weight loss is water. People on this diet often gain the weight back after they start eating carbs again. Not all diet plans are safe or work well. A lot of the evidence shows that low-carb diets aren't healthy. That's because these diets often don't include healthy foods like fruits and vegetables. Losing weight safely means balancing protein, fat, and carbs with every meal and snack. And low-carb diets don't always provide the vitamins, minerals, and fiber you need. If you have a serious medical condition, talk to your doctor before you try any diet. These conditions include kidney disease, heart disease, type 2 diabetes, high cholesterol, and high blood pressure. If you are pregnant, it may not be safe for your baby if you are on a low-carb diet. How can you lose weight safely? You might have heard that a diet plan helped another person lose weight. But that doesn't mean that it will work for you. It is very hard to stay on a diet that includes lots of big changes in your eating habits. If you want to get to a healthy weight and stay there, making healthy lifestyle changes will often work better than dieting. These steps can help. · Make a plan for change. Work with your doctor to create a plan that is right for you. · See a dietitian. He or she can show you how to make healthy changes in your eating habits. · Manage stress. If you have a lot of stress in your life, it can be hard to focus on making healthy changes to your daily habits. · Track your food and activity.  You are likely to do better at losing weight if you keep track of what you eat and what you do. Follow-up care is a key part of your treatment and safety. Be sure to make and go to all appointments, and call your doctor if you are having problems. It's also a good idea to know your test results and keep a list of the medicines you take. Where can you learn more? Go to http://mahesh-mindy.info/. Enter A121 in the search box to learn more about \"Learning About Low-Carbohydrate Diets for Weight Loss. \" Current as of: May 12, 2017 Content Version: 11.4 © 8940-9175 [employer] An[employer]. Care instructions adapted under license by [employer] (which disclaims liability or warranty for this information). If you have questions about a medical condition or this instruction, always ask your healthcare professional. [address] any warranty or liability for your use of this information. Body Mass Index: Care Instructions Your Care Instructions Body mass index (BMI) can help you see if your weight is raising your risk for health problems. It uses a formula to compare how much you weigh with how tall you are. · A BMI lower than 18.5 is considered underweight. · A BMI between 18.5 and 24.9 is considered healthy. · A BMI between 25 and 29.9 is considered overweight. A BMI of 30 or higher is considered obese. If your BMI is in the normal range, it means that you have a lower risk for weight-related health problems. If your BMI is in the overweight or obese range, you may be at increased risk for weight-related health problems, such as high blood pressure, heart disease, stroke, arthritis or joint pain, and diabetes. If your BMI is in the underweight range, you may be at increased risk for health problems such as fatigue, lower protection (immunity) against illness, muscle loss, bone loss, hair loss, and hormone problems. BMI is just one measure of your risk for weight-related health problems. You may be at higher risk for health problems if you are not active, you eat an unhealthy diet, or you drink too much alcohol or use tobacco products. Follow-up care is a key part of your treatment and safety. Be sure to make and go to all appointments, and call your doctor if you are having problems. It's also a good idea to know your test results and keep a list of the medicines you take. How can you care for yourself at home? · Practice healthy eating habits. This includes eating plenty of fruits, vegetables, whole grains, lean protein, and low-fat dairy. · If your doctor recommends it, get more exercise. Walking is a good choice. Bit by bit, increase the amount you walk every day. Try for at least 30 minutes on most days of the week. · Do not smoke. Smoking can increase your risk for health problems. If you need help quitting, talk to your doctor about stop-smoking programs and medicines. These can increase your chances of quitting for good. · Limit alcohol to 2 drinks a day for men and 1 drink a day for women. Too much alcohol can cause health problems. If you have a BMI higher than 25 · Your doctor may do other tests to check your risk for weight-related health problems. This may include measuring the distance around your waist. A waist measurement of more than 40 inches in men or 35 inches in women can increase the risk of weight-related health problems. · Talk with your doctor about steps you can take to stay healthy or improve your health. You may need to make lifestyle changes to lose weight and stay healthy, such as changing your diet and getting regular exercise. If you have a BMI lower than 18.5 · Your doctor may do other tests to check your risk for health problems. · Talk with your doctor about steps you can take to stay healthy or improve your health.  You may need to make lifestyle changes to gain or maintain weight and stay healthy, such as getting more healthy foods in your diet and doing exercises to build muscle. Where can you learn more? Go to http://mahesh-mindy.info/. Enter S176 in the search box to learn more about \"Body Mass Index: Care Instructions. \" Current as of: October 13, 2016 Content Version: 11.4 © 4438-4818 DocRun. Care instructions adapted under license by WebMD (which disclaims liability or warranty for this information). If you have questions about a medical condition or this instruction, always ask your healthcare professional. [address] any warranty or liability for your use of this information. Introducing [facility]! Caden [employer] introduces [employer]TechNow patient portal. Now you can access parts of your medical record, email your doctor's office, and request medication refills online. 1. In your internet browser, go to [email or web address]. Winning [employer] 2. Click on the First Time User? Click Here link in the Sign In box. You will see the New Member Sign Up page. 3. Enter your Eng[employer]Now Access Code exactly as it appears below. You will not need to use this code after youve completed the sign-up process. If you do not sign up before the expiration date, you must request a new code. · [employer]Now Access Code: [email or web address] Expires: 7/19/2018 11:45 AM 
 
4. Enter the last four digits of your Social Security Number (xxxx) and Date of Birth (mm/dd/yyyy) as indicated and click Submit. You will be taken to the next sign-up page. 5. Create a VoIP Supplyt ID. This will be your [employer]Now login ID and cannot be changed, so think of one that is secure and easy to remember. 6. Create a [employer] password. You can change your password at any time. 7. Enter your Password Reset Question and Answer. This can be used at a later time if you forget your password. 8. Enter your e-mail address. You will receive e-mail notification when new information is available in [address] 9. Click Sign Up. You can now view and download portions of your medical record. 10. Click the Download Summary menu link to download a portable copy of your medical information. If you have questions, please visit the Frequently Asked Questions section of the [employer] website. Remember, [employer] is NOT to be used for urgent needs. For medical emergencies, dial 911. Now available from your iPhone and Android! Please provide this summary of care documentation to your next provider. Your primary care clinician is listed as Leticia Moore. If you have any questions after today's visit, please call [phone].

## 2018-05-16 ENCOUNTER — LAB ONLY (OUTPATIENT)
Dept: FAMILY MEDICINE CLINIC | Age: 53
End: 2018-05-16

## 2018-05-16 DIAGNOSIS — Z01.89 ROUTINE LAB DRAW: Primary | ICD-10-CM

## 2018-05-16 NOTE — PROGRESS NOTES
Verified name and  with parent. Order verified in [facility] per [facility]. Informed patient of procedure, and rational, denies any questions. Successful cannulation of patient's vein (L antacubital) via # 23 Gauge  butterfly using aseptic technique no swelling noted. 2x2 and band-aid applied. Specimens 1lav and 1 sst sent to [facility] lab for further testing Patient tolerated the procedure without incident.  Released from clinic

## 2018-05-17 LAB
ALBUMIN SERPL-MCNC: 4 G/DL (ref 3.5–5.5)
ALBUMIN/GLOB SERPL: 1.3 {RATIO} (ref 1.2–2.2)
ALP SERPL-CCNC: 120 IU/L (ref 39–117)
ALT SERPL-CCNC: 14 IU/L (ref 0–32)
AST SERPL-CCNC: 13 IU/L (ref 0–40)
BASOPHILS # BLD AUTO: 0 X10E3/UL (ref 0–0.2)
BASOPHILS NFR BLD AUTO: 0 %
BILIRUB SERPL-MCNC: 0.4 MG/DL (ref 0–1.2)
BUN SERPL-MCNC: 13 MG/DL (ref 6–24)
BUN/CREAT SERPL: 14 (ref 9–23)
CALCIUM SERPL-MCNC: 9.4 MG/DL (ref 8.7–10.2)
CHLORIDE SERPL-SCNC: 105 MMOL/L (ref 96–106)
CHOLEST SERPL-MCNC: 175 MG/DL (ref 100–199)
CO2 SERPL-SCNC: 22 MMOL/L (ref 18–29)
CREAT SERPL-MCNC: 0.92 MG/DL (ref 0.57–1)
EOSINOPHIL # BLD AUTO: 0.2 X10E3/UL (ref 0–0.4)
EOSINOPHIL NFR BLD AUTO: 2 %
ERYTHROCYTE [DISTWIDTH] IN BLOOD BY AUTOMATED COUNT: 15.7 % (ref 12.3–15.4)
EST. AVERAGE GLUCOSE BLD GHB EST-MCNC: 120 MG/DL
GFR SERPLBLD CREATININE-BSD FMLA CKD-EPI: 72 ML/MIN/1.73
GFR SERPLBLD CREATININE-BSD FMLA CKD-EPI: 83 ML/MIN/1.73
GLOBULIN SER CALC-MCNC: 3.1 G/DL (ref 1.5–4.5)
GLUCOSE SERPL-MCNC: 83 MG/DL (ref 65–99)
HBA1C MFR BLD: 5.8 % (ref 4.8–5.6)
HCT VFR BLD AUTO: 38.3 % (ref 34–46.6)
HDLC SERPL-MCNC: 47 MG/DL
HGB BLD-MCNC: 12.8 G/DL (ref 11.1–15.9)
IMM GRANULOCYTES # BLD: 0 X10E3/UL (ref 0–0.1)
IMM GRANULOCYTES NFR BLD: 0 %
LDLC SERPL CALC-MCNC: 113 MG/DL (ref 0–99)
LYMPHOCYTES # BLD AUTO: 2.5 X10E3/UL (ref 0.7–3.1)
LYMPHOCYTES NFR BLD AUTO: 26 %
MCH RBC QN AUTO: 26.1 PG (ref 26.6–33)
MCHC RBC AUTO-ENTMCNC: 33.4 G/DL (ref 31.5–35.7)
MCV RBC AUTO: 78 FL (ref 79–97)
MONOCYTES # BLD AUTO: 0.4 X10E3/UL (ref 0.1–0.9)
MONOCYTES NFR BLD AUTO: 4 %
NEUTROPHILS # BLD AUTO: 6.6 X10E3/UL (ref 1.4–7)
NEUTROPHILS NFR BLD AUTO: 68 %
PLATELET # BLD AUTO: 400 X10E3/UL (ref 150–379)
POTASSIUM SERPL-SCNC: 4.2 MMOL/L (ref 3.5–5.2)
PROT SERPL-MCNC: 7.1 G/DL (ref 6–8.5)
RBC # BLD AUTO: 4.91 X10E6/UL (ref 3.77–5.28)
SODIUM SERPL-SCNC: 141 MMOL/L (ref 134–144)
TRIGL SERPL-MCNC: 76 MG/DL (ref 0–149)
TSH SERPL DL<=0.005 MIU/L-ACNC: 1.08 UIU/ML (ref 0.45–4.5)
VLDLC SERPL CALC-MCNC: 15 MG/DL (ref 5–40)
WBC # BLD AUTO: 9.7 X10E3/UL (ref 3.4–10.8)

## 2018-05-18 ENCOUNTER — HOSPITAL ENCOUNTER (OUTPATIENT)
Dept: LAB | Age: 53
Discharge: HOME | End: 2018-05-18

## 2018-05-18 PROCEDURE — 99001 SPECIMEN HANDLING PT-LAB: CPT | Performed by: INTERNAL MEDICINE

## 2018-05-19 LAB — HEMOCCULT STL QL IA: NEGATIVE

## 2018-05-20 DIAGNOSIS — Z12.11 COLON CANCER SCREENING: ICD-10-CM

## 2018-05-22 ENCOUNTER — TELEPHONE (OUTPATIENT)
Dept: FAMILY MEDICINE CLINIC | Age: 53
End: 2018-05-22

## 2018-05-22 PROBLEM — D75.839 THROMBOCYTOSIS: Status: ACTIVE | Noted: 2018-05-22

## 2018-05-22 PROBLEM — E78.2 MIXED HYPERLIPIDEMIA: Status: ACTIVE | Noted: 2018-05-22

## 2018-05-22 PROBLEM — R73.03 PRE-DIABETES: Status: ACTIVE | Noted: 2018-05-22

## 2018-05-23 NOTE — TELEPHONE ENCOUNTER
Patient called, spoke with patient about regards to labs, patient verbally understood.  I asked if patient wanted a letter mailed out patient stated she has an appt on 6/6 and will request a copy

## 2018-06-06 ENCOUNTER — OFFICE VISIT (OUTPATIENT)
Dept: FAMILY MEDICINE CLINIC | Age: 53
End: 2018-06-06

## 2018-06-06 VITALS
WEIGHT: 210 LBS | RESPIRATION RATE: 18 BRPM | HEIGHT: 61 IN | OXYGEN SATURATION: 95 % | DIASTOLIC BLOOD PRESSURE: 76 MMHG | SYSTOLIC BLOOD PRESSURE: 124 MMHG | TEMPERATURE: 97.8 F | BODY MASS INDEX: 39.65 KG/M2 | HEART RATE: 103 BPM

## 2018-06-06 DIAGNOSIS — R73.03 PRE-DIABETES: ICD-10-CM

## 2018-06-06 DIAGNOSIS — E66.01 MORBID OBESITY (HCC): ICD-10-CM

## 2018-06-06 DIAGNOSIS — I10 ESSENTIAL HYPERTENSION: Primary | ICD-10-CM

## 2018-06-06 DIAGNOSIS — E78.2 MIXED HYPERLIPIDEMIA: ICD-10-CM

## 2018-06-06 RX ORDER — AMLODIPINE BESYLATE 5 MG/1
5 TABLET ORAL DAILY
Qty: 90 TAB | Refills: 1 | Status: SHIPPED | OUTPATIENT
Start: 2018-06-06 | End: 2018-12-03

## 2018-06-06 NOTE — MR AVS SNAPSHOT
[address] [address]60 Shraddha Solis [address] 
[phone] Patient: Domonique Waddell MRN: [id number] FIY:[id number] Visit Information Date & Time Provider Department Dept. Phone Encounter #  
 6/6/2018  4:45 PM Maged Carrasco MD [address] [facility] [phone] [id number] Follow-up Instructions Return in about 3 months (around 9/6/2018), or if symptoms worsen or fail to improve, for htn, obesity, pre-diabetes. Follow-up and Disposition History Upcoming Health Maintenance Date Due DTaP/Tdap/Td series (1 - Tdap) 5/7/1987 BREAST CANCER SCRN MAMMOGRAM 4/22/2018 Influenza Age 5 to Adult 8/1/2018 FOBT Q 1 YEAR AGE 50-75 5/18/2019 PAP AKA CERVICAL CYTOLOGY 4/1/2021 Allergies as of 6/6/2018  Review Complete On: 6/6/2018 By: Maged Carrasco MD  
  
 Severity Noted Reaction Type Reactions Citric Acid  04/20/2018    Rash Red Dye  04/20/2018    Hives Current Immunizations  Never Reviewed No immunizations on file. Not reviewed this visit You Were Diagnosed With   
  
 Codes Comments Essential hypertension    -  Primary ICD-10-CM: I10 
ICD-9-CM: 401.9 Pre-diabetes     ICD-10-CM: R73.03 
ICD-9-CM: 790.29 Mixed hyperlipidemia     ICD-10-CM: E78.2 ICD-9-CM: 272.2 Morbid obesity ([facility] [address]     ICD-10-CM: E66.01 
ICD-9-CM: 278.01 Vitals BP Pulse Temp Resp Height(growth percentile) Weight(growth percentile) 124/76 (!) 103 97.8 °F (36.6 °C) (Oral) 18 5' 1\" (1.549 m) 210 lb (95.3 kg) SpO2 BMI OB Status Smoking Status 95% 39.68 kg/m2 Menopause Never Smoker Vitals History BMI and BSA Data Body Mass Index Body Surface Area  
 39.68 kg/m 2 2.03 m 2 Preferred Pharmacy Pharmacy Name Phone [id number] [address] [phone] Your Updated Medication List  
  
   
 This list is accurate as of 6/6/18  5:44 PM.  Always use your most recent med list. amLODIPine 5 mg tablet Commonly known as:  Lucien Rings Take 1 Tab by mouth daily for 180 days. VITAMIN D3 2,000 unit Tab Generic drug:  cholecalciferol (vitamin D3) Take  by mouth. Prescriptions Sent to Pharmacy Refills  
 amLODIPine (NORVASC) 5 mg tablet 1 Sig: Take 1 Tab by mouth daily for 180 days. Class: Normal  
 Pharmacy: [address] [place] [address] #: [phone] Route: Oral  
  
Follow-up Instructions Return in about 3 months (around 9/6/2018), or if symptoms worsen or fail to improve, for htn, obesity, pre-diabetes. Patient Instructions Call to get your mammogram scheduled Continue current medications Continue to work on increased exercise each week, try to challenge yourself to do more Continue with water intake, goal 2 liters per day Learning About Low-Carbohydrate Diets for Weight Loss What is a low-carbohydrate diet? Low-carb diets avoid foods that are high in carbohydrate. These high-carb foods include pasta, bread, rice, cereal, fruits, and starchy vegetables. Instead, these diets usually have you eat foods that are high in fat and protein. Many people lose weight quickly on a low-carb diet. But the early weight loss is water. People on this diet often gain the weight back after they start eating carbs again. Not all diet plans are safe or work well. A lot of the evidence shows that low-carb diets aren't healthy. That's because these diets often don't include healthy foods like fruits and vegetables. Losing weight safely means balancing protein, fat, and carbs with every meal and snack. And low-carb diets don't always provide the vitamins, minerals, and fiber you need.  
If you have a serious medical condition, talk to your doctor before you try any diet. These conditions include kidney disease, heart disease, type 2 diabetes, high cholesterol, and high blood pressure. If you are pregnant, it may not be safe for your baby if you are on a low-carb diet. How can you lose weight safely? You might have heard that a diet plan helped another person lose weight. But that doesn't mean that it will work for you. It is very hard to stay on a diet that includes lots of big changes in your eating habits. If you want to get to a healthy weight and stay there, making healthy lifestyle changes will often work better than dieting. These steps can help. · Make a plan for change. Work with your doctor to create a plan that is right for you. · See a dietitian. He or she can show you how to make healthy changes in your eating habits. · Manage stress. If you have a lot of stress in your life, it can be hard to focus on making healthy changes to your daily habits. · Track your food and activity. You are likely to do better at losing weight if you keep track of what you eat and what you do. Follow-up care is a key part of your treatment and safety. Be sure to make and go to all appointments, and call your doctor if you are having problems. It's also a good idea to know your test results and keep a list of the medicines you take. Where can you learn more? Go to http://mahesh-mindy.info/. Enter A121 in the search box to learn more about \"Learning About Low-Carbohydrate Diets for Weight Loss. \" Current as of: December 8, 2016 Content Version: 11.3 © 8626-9909 Healthwise, Incorporated. Care instructions adapted under license by [employer] (which disclaims liability or warranty for this information). If you have questions about a medical condition or this instruction, always ask your healthcare professional. [address] any warranty or liability for your use of this information. Body Mass Index: Care Instructions Your Care Instructions Body mass index (BMI) can help you see if your weight is raising your risk for health problems. It uses a formula to compare how much you weigh with how tall you are. · A BMI lower than 18.5 is considered underweight. · A BMI between 18.5 and 24.9 is considered healthy. · A BMI between 25 and 29.9 is considered overweight. A BMI of 30 or higher is considered obese. If your BMI is in the normal range, it means that you have a lower risk for weight-related health problems. If your BMI is in the overweight or obese range, you may be at increased risk for weight-related health problems, such as high blood pressure, heart disease, stroke, arthritis or joint pain, and diabetes. If your BMI is in the underweight range, you may be at increased risk for health problems such as fatigue, lower protection (immunity) against illness, muscle loss, bone loss, hair loss, and hormone problems. BMI is just one measure of your risk for weight-related health problems. You may be at higher risk for health problems if you are not active, you eat an unhealthy diet, or you drink too much alcohol or use tobacco products. Follow-up care is a key part of your treatment and safety. Be sure to make and go to all appointments, and call your doctor if you are having problems. It's also a good idea to know your test results and keep a list of the medicines you take. How can you care for yourself at home? · Practice healthy eating habits. This includes eating plenty of fruits, vegetables, whole grains, lean protein, and low-fat dairy. · If your doctor recommends it, get more exercise. Walking is a good choice. Bit by bit, increase the amount you walk every day. Try for at least 30 minutes on most days of the week. · Do not smoke. Smoking can increase your risk for health problems.  If you need help quitting, talk to your doctor about stop-smoking programs and medicines. These can increase your chances of quitting for good. · Limit alcohol to 2 drinks a day for men and 1 drink a day for women. Too much alcohol can cause health problems. If you have a BMI higher than 25 · Your doctor may do other tests to check your risk for weight-related health problems. This may include measuring the distance around your waist. A waist measurement of more than 40 inches in men or 35 inches in women can increase the risk of weight-related health problems. · Talk with your doctor about steps you can take to stay healthy or improve your health. You may need to make lifestyle changes to lose weight and stay healthy, such as changing your diet and getting regular exercise. If you have a BMI lower than 18.5 · Your doctor may do other tests to check your risk for health problems. · Talk with your doctor about steps you can take to stay healthy or improve your health. You may need to make lifestyle changes to gain or maintain weight and stay healthy, such as getting more healthy foods in your diet and doing exercises to build muscle. Where can you learn more? Go to http://mahesh-mindy.info/. Enter S176 in the search box to learn more about \"Body Mass Index: Care Instructions. \" Current as of: October 13, 2016 Content Version: 11.4 © 9485-8679 Healthwise, Roller. Care instructions adapted under license by [employer] (which disclaims liability or warranty for this information). If you have questions about a medical condition or this instruction, always ask your healthcare professional. [place] [address] any warranty or liability for your use of this information. Patient Instructions History Introducing [facility]! [facility] introduces [employer] patient portal. Now you can access parts of your medical record, email your doctor's office, and request medication refills online.    
 
1. In your internet browser, go to [email or web address] 2. Click on the First Time User? Click Here link in the Sign In box. You will see the New Member Sign Up page. 3. Enter your [employer] Access Code exactly as it appears below. You will not need to use this code after youve completed the sign-up process. If you do not sign up before the expiration date, you must request a new code. · [employer] Access Code: [email or web address] Expires: 7/19/2018 11:45 AM 
 
4. Enter the last four digits of your Social Security Number (xxxx) and Date of Birth (mm/dd/yyyy) as indicated and click Submit. You will be taken to the next sign-up page. 5. Create a EnergyHubt ID. This will be your [employer] login ID and cannot be changed, so think of one that is secure and easy to remember. 6. Create a [employer] password. You can change your password at any time. 7. Enter your Password Reset Question and Answer. This can be used at a later time if you forget your password. 8. Enter your e-mail address. You will receive e-mail notification when new information is available in [address] 9. Click Sign Up. You can now view and download portions of your medical record. 10. Click the Download Summary menu link to download a portable copy of your medical information. If you have questions, please visit the Frequently Asked Questions section of the [employer] website. Remember, [employer] is NOT to be used for urgent needs. For medical emergencies, dial 911. Now available from your iPhone and Android! Please provide this summary of care documentation to your next provider. Your primary care clinician is listed as Leticia Moore. If you have any questions after today's visit, please call [phone].

## 2018-06-06 NOTE — PROGRESS NOTES
Internal Medicine Follow Up Visit Note    Chief Complaint   Patient presents with    Hypertension       HPI:  Teodora Savage is a 47 y.o.  female with history significant for HTN, obesity is here for the above complaint(s). Hypertension: Today BP is controlled. Walking more since last visit, less processed food and cooking more. Less salt in diet. Taking norvasc 5 mg every morning. No side effects from medications. Medication good compliance. Denies headache, lightheadedness, dizziness, vision changes, chest pain, rapid/irregular heart rate, shortness of breath, cough, abdominal pain, leg swelling, leg pain. Feels a little anxious. Obesity: Weight is up 1lb from last visit. Working on increasing exercise and healthier diet. ROS: as per HPI    Current Outpatient Prescriptions   Medication Sig    amLODIPine (NORVASC) 5 mg tablet Take 1 Tab by mouth daily for 180 days.  cholecalciferol, vitamin D3, (VITAMIN D3) 2,000 unit tab Take  by mouth. No current facility-administered medications for this visit. Health Maintenance   Topic Date Due    DTaP/Tdap/Td series (1 - Tdap) 05/07/1987    BREAST CANCER SCRN MAMMOGRAM  04/22/2018    Influenza Age 5 to Adult  08/01/2018    FOBT Q 1 YEAR AGE 50-75  05/18/2019    PAP AKA CERVICAL CYTOLOGY  04/01/2021       There is no immunization history on file for this patient. Allergies and Medications: Reviewed and updated in EMR. Patient Active Problem List   Diagnosis Code    Morbid obesity ([facility] E66.01    Hypertension I10    Family history of diabetes mellitus (DM) Z83.3    Pre-diabetes R73.03    Mixed hyperlipidemia E78.2    Thrombocytosis ([facility] D47.3       Family History, Social History, Past Medical History, Surgical History: Reviewed and updated in EMR as appropriate.       OBJECTIVE:   Visit Vitals    /76    Pulse (!) 103    Temp 97.8 °F (36.6 °C) (Oral)    Resp 18    Ht 5' 1\" (1.549 m)    Wt 210 lb (95.3 kg)    SpO2 95%  Comment: ra    BMI 39.68 kg/m2        BP Readings from Last 3 Encounters:   06/06/18 124/76   04/20/18 (!) 143/92   10/19/12 126/83     Wt Readings from Last 3 Encounters:   06/06/18 210 lb (95.3 kg)   04/20/18 209 lb (94.8 kg)   10/19/12 181 lb 3.5 oz (82.2 kg)       General: Pleasant adult woman sitting comfortably in no acute distress  HEENT: Head is atraumatic normo-cephalic. Neuro: Alert and oriented x3. MSE: mood euthymic, affect congruent and reactive. Nursing Notes Reviewed. LABS/RADIOLOGICAL TESTS:      Lab Results   Component Value Date/Time    WBC 9.7 05/16/2018 09:50 AM    HGB 12.8 05/16/2018 09:50 AM    HCT 38.3 05/16/2018 09:50 AM    PLATELET 094 (H) 95/26/6418 09:50 AM     Lab Results   Component Value Date/Time    Sodium 141 05/16/2018 09:50 AM    Potassium 4.2 05/16/2018 09:50 AM    Chloride 105 05/16/2018 09:50 AM    CO2 22 05/16/2018 09:50 AM    Glucose 83 05/16/2018 09:50 AM    BUN 13 05/16/2018 09:50 AM    Creatinine 0.92 05/16/2018 09:50 AM     Lab Results   Component Value Date/Time    Cholesterol, total 175 05/16/2018 09:50 AM    HDL Cholesterol 47 05/16/2018 09:50 AM    LDL, calculated 113 (H) 05/16/2018 09:50 AM    Triglyceride 76 05/16/2018 09:50 AM       All lab results and radiological studies were reviewed and discussed with the patient. ASSESSMENT/PLAN:      ICD-10-CM ICD-9-CM    1. Essential hypertension I10 401.9 amLODIPine (NORVASC) 5 mg tablet   2. Pre-diabetes R73.03 790.29 Diet and weight loss discussed   3. Mixed hyperlipidemia E78.2 272.2 Diet and weight loss discussed   4. Morbid obesity ([address] E66.01 278.01 Work on diet and exercise, discussed with patient  BMI educational handout provided       Requested Prescriptions     Signed Prescriptions Disp Refills    amLODIPine (NORVASC) 5 mg tablet 90 Tab 1     Sig: Take 1 Tab by mouth daily for 180 days. Patient verbalized understanding and agreement with the plan.     Patient was given an after-visit summary. Follow-up Disposition:  Return in about 3 months (around 9/6/2018), or if symptoms worsen or fail to improve, for htn, obesity, pre-diabetes. or sooner if worsening symptoms. More than 50% of this 25 min visit was spent counseling the patient face to face about etiology and treatment of health conditions outlined in assessment and plan. Larry Owens M.D.   [place] [phone]  [facility] [phone]

## 2018-06-06 NOTE — PROGRESS NOTES
Chief Complaint   Patient presents with    Hypertension     1. Have you been to the ER, urgent care clinic since your last visit? Hospitalized since your last visit? No    2. Have you seen or consulted any other health care providers outside of the [facility] since your last visit? Include any pap smears or colon screening.  No

## 2018-06-06 NOTE — PATIENT INSTRUCTIONS
Call to get your mammogram scheduled    Continue current medications    Continue to work on increased exercise each week, try to challenge yourself to do more    Continue with water intake, goal 2 liters per day    Learning About Low-Carbohydrate Diets for Weight Loss  What is a low-carbohydrate diet? Low-carb diets avoid foods that are high in carbohydrate. These high-carb foods include pasta, bread, rice, cereal, fruits, and starchy vegetables. Instead, these diets usually have you eat foods that are high in fat and protein. Many people lose weight quickly on a low-carb diet. But the early weight loss is water. People on this diet often gain the weight back after they start eating carbs again. Not all diet plans are safe or work well. A lot of the evidence shows that low-carb diets aren't healthy. That's because these diets often don't include healthy foods like fruits and vegetables. Losing weight safely means balancing protein, fat, and carbs with every meal and snack. And low-carb diets don't always provide the vitamins, minerals, and fiber you need. If you have a serious medical condition, talk to your doctor before you try any diet. These conditions include kidney disease, heart disease, type 2 diabetes, high cholesterol, and high blood pressure. If you are pregnant, it may not be safe for your baby if you are on a low-carb diet. How can you lose weight safely? You might have heard that a diet plan helped another person lose weight. But that doesn't mean that it will work for you. It is very hard to stay on a diet that includes lots of big changes in your eating habits. If you want to get to a healthy weight and stay there, making healthy lifestyle changes will often work better than dieting. These steps can help. · Make a plan for change. Work with your doctor to create a plan that is right for you. · See a dietitian.  He or she can show you how to make healthy changes in your eating habits. · Manage stress. If you have a lot of stress in your life, it can be hard to focus on making healthy changes to your daily habits. · Track your food and activity. You are likely to do better at losing weight if you keep track of what you eat and what you do. Follow-up care is a key part of your treatment and safety. Be sure to make and go to all appointments, and call your doctor if you are having problems. It's also a good idea to know your test results and keep a list of the medicines you take. Where can you learn more? Go to http://mahesh-mindy.info/. Enter A121 in the search box to learn more about \"Learning About Low-Carbohydrate Diets for Weight Loss. \"  Current as of: December 8, 2016  Content Version: 11.3  © 0845-5412 [employer]. Care instructions adapted under license by [employer] (which disclaims liability or warranty for this information). If you have questions about a medical condition or this instruction, always ask your healthcare professional. [address] any warranty or liability for your use of this information. Body Mass Index: Care Instructions  Your Care Instructions    Body mass index (BMI) can help you see if your weight is raising your risk for health problems. It uses a formula to compare how much you weigh with how tall you are. · A BMI lower than 18.5 is considered underweight. · A BMI between 18.5 and 24.9 is considered healthy. · A BMI between 25 and 29.9 is considered overweight. A BMI of 30 or higher is considered obese. If your BMI is in the normal range, it means that you have a lower risk for weight-related health problems. If your BMI is in the overweight or obese range, you may be at increased risk for weight-related health problems, such as high blood pressure, heart disease, stroke, arthritis or joint pain, and diabetes.  If your BMI is in the underweight range, you may be at increased risk for health problems such as fatigue, lower protection (immunity) against illness, muscle loss, bone loss, hair loss, and hormone problems. BMI is just one measure of your risk for weight-related health problems. You may be at higher risk for health problems if you are not active, you eat an unhealthy diet, or you drink too much alcohol or use tobacco products. Follow-up care is a key part of your treatment and safety. Be sure to make and go to all appointments, and call your doctor if you are having problems. It's also a good idea to know your test results and keep a list of the medicines you take. How can you care for yourself at home? · Practice healthy eating habits. This includes eating plenty of fruits, vegetables, whole grains, lean protein, and low-fat dairy. · If your doctor recommends it, get more exercise. Walking is a good choice. Bit by bit, increase the amount you walk every day. Try for at least 30 minutes on most days of the week. · Do not smoke. Smoking can increase your risk for health problems. If you need help quitting, talk to your doctor about stop-smoking programs and medicines. These can increase your chances of quitting for good. · Limit alcohol to 2 drinks a day for men and 1 drink a day for women. Too much alcohol can cause health problems. If you have a BMI higher than 25  · Your doctor may do other tests to check your risk for weight-related health problems. This may include measuring the distance around your waist. A waist measurement of more than 40 inches in men or 35 inches in women can increase the risk of weight-related health problems. · Talk with your doctor about steps you can take to stay healthy or improve your health. You may need to make lifestyle changes to lose weight and stay healthy, such as changing your diet and getting regular exercise. If you have a BMI lower than 18.5  · Your doctor may do other tests to check your risk for health problems.   · Talk with your doctor about steps you can take to stay healthy or improve your health. You may need to make lifestyle changes to gain or maintain weight and stay healthy, such as getting more healthy foods in your diet and doing exercises to build muscle. Where can you learn more? Go to http://mahesh-mindy.info/. Enter S176 in the search box to learn more about \"Body Mass Index: Care Instructions. \"  Current as of: October 13, 2016  Content Version: 11.4  © 7464-0271 [employer]. Care instructions adapted under license by [employer] (which disclaims liability or warranty for this information). If you have questions about a medical condition or this instruction, always ask your healthcare professional. [address] any warranty or liability for your use of this information.

## 2018-06-25 ENCOUNTER — HOSPITAL ENCOUNTER (OUTPATIENT)
Dept: MAMMOGRAPHY | Age: 53
Discharge: HOME | End: 2018-06-25
Attending: OBSTETRICS & GYNECOLOGY
Payer: COMMERCIAL

## 2018-06-25 DIAGNOSIS — Z12.31 VISIT FOR SCREENING MAMMOGRAM: ICD-10-CM

## 2018-06-25 PROCEDURE — 77063 BREAST TOMOSYNTHESIS BI: CPT

## 2019-02-04 ENCOUNTER — OFFICE VISIT (OUTPATIENT)
Dept: FAMILY MEDICINE CLINIC | Age: 54
End: 2019-02-04

## 2019-02-04 VITALS
WEIGHT: 209.2 LBS | BODY MASS INDEX: 39.5 KG/M2 | DIASTOLIC BLOOD PRESSURE: 107 MMHG | TEMPERATURE: 98.4 F | HEART RATE: 95 BPM | OXYGEN SATURATION: 98 % | HEIGHT: 61 IN | RESPIRATION RATE: 16 BRPM | SYSTOLIC BLOOD PRESSURE: 147 MMHG

## 2019-02-04 DIAGNOSIS — E66.01 MORBID OBESITY (HCC): ICD-10-CM

## 2019-02-04 DIAGNOSIS — N28.1 RENAL CYST, RIGHT: ICD-10-CM

## 2019-02-04 DIAGNOSIS — I10 ESSENTIAL HYPERTENSION: Primary | ICD-10-CM

## 2019-02-04 RX ORDER — HYDROCHLOROTHIAZIDE 12.5 MG/1
12.5 TABLET ORAL DAILY
Qty: 90 TAB | Refills: 1 | Status: SHIPPED | OUTPATIENT
Start: 2019-02-04 | End: 2019-05-20 | Stop reason: SDUPTHER

## 2019-02-04 NOTE — PROGRESS NOTES
Chief Complaint   Patient presents with   [facility] Follow-up     ED visit. Patient states that she has a right renal cyst     1. Have you been to the ER, urgent care clinic since your last visit? Hospitalized since your last visit? [facility]-1/13/2019    2. Have you seen or consulted any other health care providers outside of the [address] [place] [address] since your last visit? Include any pap smears or colon screening.  No

## 2019-02-04 NOTE — PROGRESS NOTES
ESTABLISH CARE VISIT    SUBJECTIVE:     Chief Complaint   Patient presents with    Follow-up     ED visit. Patient states that she has a right renal cyst       HPI: 47 y.o. female with PMHx significant for hypertension is here for the above chief complaint(s). ED Follow-up/Right Renal Cyst  Patient went to ED on 1/14/2019 for left-sided abdominal pain and back pain. Patient underwent lab testing and CT for possible diverticulitis. The CT suggested gastroenteritis and a renal cyst was also incidentally found, otherwise unremarkable. The patient was treated for gastroenteritis as it clinically fit the history and was advised to follow-up with PCP regarding right renal cyst. Patient is here today inquiring about this finding. Patient feels much better since her visit to the ED. . Of note, patient also had mild leukocytosis in the ED as well, likely from gastroenteritis  Denies headache, lightheadedness, dizziness, vision changes, chest pain at rest or with exertion, rapid/irregular heart rate, shortness of breath at rest or with exertion, cough, abdominal pain, leg swelling, leg pain. Review of Systems   Constitutional: Negative for chills, fever, malaise/fatigue and weight loss. Eyes: Negative for blurred vision, double vision and pain. Respiratory: Negative for cough, sputum production, shortness of breath and wheezing. Cardiovascular: Negative for chest pain, palpitations, orthopnea, claudication and leg swelling. Gastrointestinal: Negative for abdominal pain, constipation, diarrhea, nausea and vomiting. Genitourinary: Negative for dysuria, frequency and urgency. Neurological: Negative for dizziness, tingling and headaches. @[facility]@  Current Outpatient Medications   Medication Sig    amLODIPine (NORVASC) 5 mg tablet Take 5 mg by mouth daily.  cholecalciferol, vitamin D3, (VITAMIN D3) 2,000 unit tab Take 1 Tab by mouth daily.     dicyclomine (BENTYL) 20 mg tablet 1 tab every 6 hours as needed abdominal cramping     No current facility-administered medications for this visit. Health Maintenance   Topic Date Due    DTaP/Tdap/Td series (1 - Tdap) 05/07/1987    Shingrix Vaccine Age 50> (1 of 2) 05/07/2016    FOBT Q 1 YEAR AGE 50-75  05/18/2019    BREAST CANCER SCRN MAMMOGRAM  06/25/2019    PAP AKA CERVICAL CYTOLOGY  04/11/2021    Influenza Age 9 to Adult  Completed       Medications and Allergies: Reviewed and confirmed in the chart    Past Medical Hx: Reviewed and confirmed in the chart  Past Medical History:   Diagnosis Date    Hypertension 2017    Morbid obesity ([facility] [address]        Patient Active Problem List   Diagnosis Code    Morbid obesity ([facility] [address] E66.01    Hypertension I10    Family history of diabetes mellitus (DM) Z83.3    Pre-diabetes R73.03    Mixed hyperlipidemia E78.2    Thrombocytosis ([facility] [address] D47.3       Family Hx, Surgical Hx, Social Hx: Reviewed and updated in EMR    OBJECTIVE:  Vitals:    02/04/19 1400   BP: (!) 147/107   Pulse: 95   Resp: 16   Temp: 98.4 °F (36.9 °C)   TempSrc: Oral   SpO2: 98%   Weight: 209 lb 3.2 oz (94.9 kg)   Height: 5' 1\" (1.549 m)       BP Readings from Last 3 Encounters:   02/04/19 (!) 147/107   01/14/19 147/89   06/06/18 124/76     Wt Readings from Last 3 Encounters:   02/04/19 209 lb 3.2 oz (94.9 kg)   01/14/19 200 lb (90.7 kg)   06/06/18 210 lb (95.3 kg)       Physical Exam   Constitutional: She is oriented to person, place, and time and well-developed, well-nourished, and in no distress. No distress. Neck: Normal range of motion. Neck supple. No thyromegaly present. Cardiovascular: Normal rate, regular rhythm, normal heart sounds and intact distal pulses. Exam reveals no gallop and no friction rub. No murmur heard. Pulmonary/Chest: Effort normal and breath sounds normal. No respiratory distress. She has no wheezes. She has no rales. She exhibits no tenderness. Lymphadenopathy:     She has no cervical adenopathy.    Neurological: She is alert and oriented to person, place, and time. Skin: Skin is warm and dry. She is not diaphoretic. Psychiatric: Mood, memory, affect and judgment normal.   Vitals reviewed. Visit Vitals  BP (!) 147/107   Pulse 95   Temp 98.4 °F (36.9 °C) (Oral)   Resp 16   Ht 5' 1\" (1.549 m)   Wt 209 lb 3.2 oz (94.9 kg)   SpO2 98%   BMI 39.53 kg/m²         Lab Results   Component Value Date/Time    WBC 15.4 (H) 01/14/2019 01:40 AM    HGB 12.8 (L) 01/14/2019 01:40 AM    HCT 38.7 01/14/2019 01:40 AM    PLATELET 530 27/57/4400 01:40 AM     Lab Results   Component Value Date/Time    Sodium 142 01/14/2019 01:40 AM    Potassium 3.5 01/14/2019 01:40 AM    Chloride 108 (H) 01/14/2019 01:40 AM    CO2 26 01/14/2019 01:40 AM    Glucose 102 01/14/2019 01:40 AM    BUN 12 01/14/2019 01:40 AM    Creatinine 0.9 01/14/2019 02:20 AM     Lab Results   Component Value Date/Time    Cholesterol, total 175 05/16/2018 09:50 AM    HDL Cholesterol 47 05/16/2018 09:50 AM    LDL, calculated 113 (H) 05/16/2018 09:50 AM    Triglyceride 76 05/16/2018 09:50 AM     No results found for: GPT    Nursing Notes Reviewed    ASSESSMENT AND PLAN  Diagnoses and all orders for this visit:    1. Essential hypertension/Leukocytosis        - BP is quite elevated today and was elevated in the ED. Patient is currently taking amlodipine 5mg daily. -     hydroCHLOROthiazide (HYDRODIURIL) 12.5 mg tablet; Take 1 Tab by mouth daily.  -    Monitor blood pressure at home. Report to clinic any systolic blood pressure >857 and/or diastolic blood pressure >592.   -     CBC WITH AUTOMATED DIFF; Future- repeat CBC in 1 month to ensure white count normalizes. -     METABOLIC PANEL, COMPREHENSIVE; Future         2. Morbid obesity ([address]         -   The patient is asked to make an attempt to improve diet and exercise patterns to aid in medical management of this problem.     3. Renal cyst, right  Discussed with patient that the renal cyst is a benign incidental finding on the CT and fits into Category II of the Bosniak classification, which advises the following:  Category II: Malignant risk less than 3%; no follow-up required  Cystic lesion with some abnormal radiological features  - <1 mm septations (hairline thin)  - fine calcifications within the septum or wall  - <3 cm in diameter  - hyperdense cysts (>20 Hounsfield units)    Advised to follow-up in 1 month for BP check and repeat potassium levels and CBC 2 to leukocytosis. AVS printed and provided to patient    Assessment and plan above discussed with patient, patient voiced understanding and agreement with plan. More than 50% of this 30 minute visit was spent face to face counseling the patient about the etiology and treatment options for the above health conditions outlined in assessment and plan     Mary PONCE  [address] [place] [address] [id number]  NMT -  [phone]

## 2019-02-05 DIAGNOSIS — I10 ESSENTIAL HYPERTENSION: ICD-10-CM

## 2019-05-20 ENCOUNTER — OFFICE VISIT (OUTPATIENT)
Dept: FAMILY MEDICINE CLINIC | Age: 54
End: 2019-05-20

## 2019-05-20 VITALS
SYSTOLIC BLOOD PRESSURE: 137 MMHG | RESPIRATION RATE: 17 BRPM | HEIGHT: 61 IN | DIASTOLIC BLOOD PRESSURE: 87 MMHG | BODY MASS INDEX: 38.71 KG/M2 | TEMPERATURE: 97.4 F | OXYGEN SATURATION: 100 % | HEART RATE: 93 BPM | WEIGHT: 205 LBS

## 2019-05-20 DIAGNOSIS — R73.03 PRE-DIABETES: ICD-10-CM

## 2019-05-20 DIAGNOSIS — Z12.39 BREAST CANCER SCREENING: ICD-10-CM

## 2019-05-20 DIAGNOSIS — E78.2 MIXED HYPERLIPIDEMIA: ICD-10-CM

## 2019-05-20 DIAGNOSIS — E55.9 VITAMIN D DEFICIENCY: ICD-10-CM

## 2019-05-20 DIAGNOSIS — D75.839 THROMBOCYTOSIS: ICD-10-CM

## 2019-05-20 DIAGNOSIS — I10 ESSENTIAL HYPERTENSION: ICD-10-CM

## 2019-05-20 DIAGNOSIS — M25.561 RIGHT KNEE PAIN, UNSPECIFIED CHRONICITY: Primary | ICD-10-CM

## 2019-05-20 DIAGNOSIS — S83.8X1A INJURY OF MENISCUS OF RIGHT KNEE, INITIAL ENCOUNTER: ICD-10-CM

## 2019-05-20 DIAGNOSIS — Z12.11 COLON CANCER SCREENING: ICD-10-CM

## 2019-05-20 DIAGNOSIS — E66.01 SEVERE OBESITY (BMI 35.0-35.9 WITH COMORBIDITY) (HCC): ICD-10-CM

## 2019-05-20 RX ORDER — HYDROCHLOROTHIAZIDE 12.5 MG/1
12.5 TABLET ORAL DAILY
Qty: 90 TAB | Refills: 3 | Status: SHIPPED | OUTPATIENT
Start: 2019-05-20 | End: 2020-05-19

## 2019-05-20 RX ORDER — NAPROXEN 500 MG/1
500 TABLET ORAL 2 TIMES DAILY WITH MEALS
Qty: 60 TAB | Refills: 2 | Status: SHIPPED | OUTPATIENT
Start: 2019-05-20 | End: 2019-08-18

## 2019-05-20 RX ORDER — LORATADINE 10 MG/1
10 TABLET ORAL
COMMUNITY

## 2019-05-20 RX ORDER — NAPROXEN SODIUM 220 MG
220 TABLET ORAL 2 TIMES DAILY WITH MEALS
COMMUNITY
End: 2019-05-20

## 2019-05-20 RX ORDER — AMLODIPINE BESYLATE 5 MG/1
5 TABLET ORAL DAILY
Qty: 90 TAB | Refills: 3 | Status: SHIPPED | OUTPATIENT
Start: 2019-05-20 | End: 2019-06-10 | Stop reason: SDUPTHER

## 2019-05-20 NOTE — PATIENT INSTRUCTIONS
We are awaiting results of xray, once the official read returns I have ordered the MRI, you should be called to schedule this Take naproxen 500 mg twice a day as needed with food for pain Stop aleve Do below exercises as tolerated Meniscus Tear: Exercises Your Care Instructions Here are some examples of typical rehabilitation exercises for your condition. Start each exercise slowly. Ease off the exercise if you start to have pain. Your doctor or physical therapist will tell you when you can start these exercises and which ones will work best for you. How to do the exercises Calf wall stretch 1. Stand facing a wall with your hands on the wall at about eye level. Put your affected leg about a step behind your other leg. 2. Keeping your back leg straight and your back heel on the floor, bend your front knee and gently bring your hip and chest toward the wall until you feel a stretch in the calf of your back leg. 3. Hold the stretch for at least 15 to 30 seconds. 4. Repeat 2 to 4 times. 5. Repeat steps 1 through 4, but this time keep your back knee bent. Hamstring wall stretch 1. Lie on your back in a doorway, with your good leg through the open door. 2. Slide your affected leg up the wall to straighten your knee. You should feel a gentle stretch down the back of your leg. 1. Do not arch your back. 2. Do not bend either knee. 3. Keep one heel touching the floor and the other heel touching the wall. Do not point your toes. 3. Hold the stretch for at least 1 minute. Then over time, try to lengthen the time you hold the stretch to as long as 6 minutes. 4. Repeat 2 to 4 times. 5. If you do not have a place to do this exercise in a doorway, there is another way to do it: 6. Lie on your back, and bend your affected leg. 7. Loop a towel under the ball and toes of that foot, and hold the ends of the towel in your hands. 8. Straighten your knee, and slowly pull back on the towel.  You should feel a gentle stretch down the back of your leg. 9. Hold the stretch for at least 15 to 30 seconds. Or even better, hold the stretch for 1 minute if you can. 10. Repeat 2 to 4 times. [facility] 1. Sit with your leg straight and supported on the floor or a firm bed. (If you feel discomfort in the front or back of your knee, place a small towel roll under your knee.) 2. Tighten the muscles on top of your thigh by pressing the back of your knee flat down to the floor. (If you feel discomfort under your kneecap, place a small towel roll under your knee.) 3. Hold for about 6 seconds, then rest up to 10 seconds. 4. Do 8 to 12 repetitions several times a day. Straight-leg raises to the front 1. Lie on your back with your good knee bent so that your foot rests flat on the floor. Your injured leg should be straight. Make sure that your low back has a normal curve. You should be able to slip your flat hand in between the floor and the small of your back, with your palm touching the floor and your back touching the back of your hand. 2. Tighten the thigh muscles in the injured leg by pressing the back of your knee flat down to the floor. Hold your knee straight. 3. Keeping the thigh muscles tight, lift your injured leg up so that your heel is about 12 inches off the floor. Hold for 5 seconds and then lower slowly. 4. Do 8 to 12 repetitions. Straight-leg raises to the back 1. Lie on your stomach, and lift your leg straight up behind you (toward the ceiling). 2. Lift your toes about 6 inches off the floor, hold for about 6 seconds, then lower slowly. 3. Do 8 to 12 repetitions. Hamstring curls 1. Lie on your stomach with your knees straight. If your kneecap is uncomfortable, roll up a washcloth and put it under your leg just above your kneecap. 2. Lift the foot of your injured leg by bending the knee so that you bring the foot up toward your buttock.  If this motion hurts, try it without bending your knee quite as far. This may help you avoid any painful motion. 3. Slowly lower your leg back to the floor. 4. Do 8 to 12 repetitions. 5. With permission from your doctor or physical therapist, you may also want to add a cuff weight to your ankle (not more than 5 pounds). With weight, you do not have to lift your leg more than 12 inches to get a hamstring workout. Wall slide with ball squeeze 1. Stand with your back against a wall and with your feet about shoulder-width apart. Your feet should be about 12 inches away from the wall. 2. Put a ball about the size of a soccer ball between your knees. Then slowly slide down the wall until your knees are bent about 20 to 30 degrees. 3. Tighten your thigh muscles by squeezing the ball between your knees. Hold that position for about 10 seconds, then stop squeezing. Rest for up to 10 seconds between repetitions. 4. Repeat 8 to 12 times. Heel raises 1. Stand with your feet a few inches apart, with your hands lightly resting on a counter or chair in front of you. 2. Slowly raise your heels off the floor while keeping your knees straight. 3. Hold for about 6 seconds, then slowly lower your heels to the floor. 4. Do 8 to 12 repetitions several times during the day. Heel dig bridging 1. Lie on your back with both knees bent and your ankles bent so that only your heels are digging into the floor. Your knees should be bent about 90 degrees. 2. Then push your heels into the floor, squeeze your buttocks, and lift your hips off the floor until your shoulders, hips, and knees are all in a straight line. 3. Hold for about 6 seconds as you continue to breathe normally, and then slowly lower your hips back down to the floor and rest for up to 10 seconds. 4. Do 8 to 12 repetitions. Shallow standing knee bends 1. Stand with your hands lightly resting on a counter or chair in front of you. Put your feet shoulder-width apart. 2. Slowly bend your knees so that you squat down like you are going to sit in a chair. Make sure your knees do not go in front of your toes. 3. Lower yourself about 6 inches. Your heels should remain on the floor at all times. 4. Rise slowly to a standing position. Follow-up care is a key part of your treatment and safety. Be sure to make and go to all appointments, and call your doctor if you are having problems. It's also a good idea to know your test results and keep a list of the medicines you take. Where can you learn more? Go to http://mahesh-mindy.info/. Enter C183 in the search box to learn more about \"Meniscus Tear: Exercises. \" Current as of: September 20, 2018 Content Version: 11.9 © 4392-6603 [employer]. Care instructions adapted under license by [employer] (which disclaims liability or warranty for this information). If you have questions about a medical condition or this instruction, always ask your healthcare professional. [place] [address] any warranty or liability for your use of this information. FOR WEIGHT: 
Continue to work on low sugar/carbohydrate diet for weight loss Exercise 30 minutes a day 4-5 days week Learning About Low-Carbohydrate Diets for Weight Loss What is a low-carbohydrate diet? Low-carb diets avoid foods that are high in carbohydrate. These high-carb foods include pasta, bread, rice, cereal, fruits, and starchy vegetables. Instead, these diets usually have you eat foods that are high in fat and protein. Many people lose weight quickly on a low-carb diet. But the early weight loss is water. People on this diet often gain the weight back after they start eating carbs again. Not all diet plans are safe or work well. A lot of the evidence shows that low-carb diets aren't healthy. That's because these diets often don't include healthy foods like fruits and vegetables.  Losing weight safely means balancing protein, fat, and carbs with every meal and snack. And low-carb diets don't always provide the vitamins, minerals, and fiber you need. If you have a serious medical condition, talk to your doctor before you try any diet. These conditions include kidney disease, heart disease, type 2 diabetes, high cholesterol, and high blood pressure. If you are pregnant, it may not be safe for your baby if you are on a low-carb diet. How can you lose weight safely? You might have heard that a diet plan helped another person lose weight. But that doesn't mean that it will work for you. It is very hard to stay on a diet that includes lots of big changes in your eating habits. If you want to get to a healthy weight and stay there, making healthy lifestyle changes will often work better than dieting. These steps can help. · Make a plan for change. Work with your doctor to create a plan that is right for you. · See a dietitian. He or she can show you how to make healthy changes in your eating habits. · Manage stress. If you have a lot of stress in your life, it can be hard to focus on making healthy changes to your daily habits. · Track your food and activity. You are likely to do better at losing weight if you keep track of what you eat and what you do. Follow-up care is a key part of your treatment and safety. Be sure to make and go to all appointments, and call your doctor if you are having problems. It's also a good idea to know your test results and keep a list of the medicines you take. Where can you learn more? Go to http://mahesh-mindy.info/. Enter A121 in the search box to learn more about \"Learning About Low-Carbohydrate Diets for Weight Loss. \" Current as of: December 8, 2016 Content Version: 11.3 © 5590-2251 [employer].  Care instructions adapted under license by "[employer] (which disclaims liability or warranty for this information). If you have questions about a medical condition or this instruction, always ask your healthcare professional. [address] any warranty or liability for your use of this information. Body Mass Index: Care Instructions Your Care Instructions Body mass index (BMI) can help you see if your weight is raising your risk for health problems. It uses a formula to compare how much you weigh with how tall you are. · A BMI lower than 18.5 is considered underweight. · A BMI between 18.5 and 24.9 is considered healthy. · A BMI between 25 and 29.9 is considered overweight. A BMI of 30 or higher is considered obese. If your BMI is in the normal range, it means that you have a lower risk for weight-related health problems. If your BMI is in the overweight or obese range, you may be at increased risk for weight-related health problems, such as high blood pressure, heart disease, stroke, arthritis or joint pain, and diabetes. If your BMI is in the underweight range, you may be at increased risk for health problems such as fatigue, lower protection (immunity) against illness, muscle loss, bone loss, hair loss, and hormone problems. BMI is just one measure of your risk for weight-related health problems. You may be at higher risk for health problems if you are not active, you eat an unhealthy diet, or you drink too much alcohol or use tobacco products. Follow-up care is a key part of your treatment and safety. Be sure to make and go to all appointments, and call your doctor if you are having problems. It's also a good idea to know your test results and keep a list of the medicines you take. How can you care for yourself at home? · Practice healthy eating habits. This includes eating plenty of fruits, vegetables, whole grains, lean protein, and low-fat dairy. · If your doctor recommends it, get more exercise.  Walking is a good choice. Bit by bit, increase the amount you walk every day. Try for at least 30 minutes on most days of the week. · Do not smoke. Smoking can increase your risk for health problems. If you need help quitting, talk to your doctor about stop-smoking programs and medicines. These can increase your chances of quitting for good. · Limit alcohol to 2 drinks a day for men and 1 drink a day for women. Too much alcohol can cause health problems. If you have a BMI higher than 25 · Your doctor may do other tests to check your risk for weight-related health problems. This may include measuring the distance around your waist. A waist measurement of more than 40 inches in men or 35 inches in women can increase the risk of weight-related health problems. · Talk with your doctor about steps you can take to stay healthy or improve your health. You may need to make lifestyle changes to lose weight and stay healthy, such as changing your diet and getting regular exercise. If you have a BMI lower than 18.5 · Your doctor may do other tests to check your risk for health problems. · Talk with your doctor about steps you can take to stay healthy or improve your health. You may need to make lifestyle changes to gain or maintain weight and stay healthy, such as getting more healthy foods in your diet and doing exercises to build muscle. Where can you learn more? Go to http://mahesh-mindy.info/. Enter S176 in the search box to learn more about \"Body Mass Index: Care Instructions. \" Current as of: October 13, 2016 Content Version: 11.4 © 5974-1625 [employer]. Care instructions adapted under license by [facility] (which disclaims liability or warranty for this information). If you have questions about a medical condition or this instruction, always ask your healthcare professional. [place] [address] any warranty or liability for your use of this information.

## 2019-05-20 NOTE — PROGRESS NOTES
Internal Medicine Follow Up Visit Note Chief Complaint Patient presents with  Knee Pain  
  right knee pain; pain started on 4/5/19 HPI:  Raúl Wilson is a 49 y.o.  female with history significant for obesity, HTN, HLD, PreDM is here for the above complaint(s). Right knee pain onset 4/5/2019 was helping move furniture and turned and twisted knee, heard a \"snap\" and excruciating pain running up knee. intermittent pain with tightening with decreased flexion since then. Feels \"snapping out\" no swelling. No numbness or tingling in feet. Chronic weakness in legs per patient. Worse with turning the wrong way and extending leg and will last for days. Aleve eases pressure some, able to be functional.  
 
Hypertension: Today BP is controlled. Taking medicines as prescribed, HCTZ 12.5 mg daily and norvasc 5 mg daily. No side effects from medications. Medication good compliance. Denies headache, lightheadedness, dizziness, vision changes, chest pain, rapid/irregular heart rate, shortness of breath, cough, abdominal pain, leg swelling. Obesity: Weight down from last check in February by 4 lbs. Working on diet and exercise. Current Outpatient Medications Medication Sig  loratadine (CLARITIN) 10 mg tablet Take 10 mg by mouth.  naproxen sodium (ALEVE) 220 mg tablet Take 220 mg by mouth two (2) times daily (with meals).  hydroCHLOROthiazide (HYDRODIURIL) 12.5 mg tablet Take 1 Tab by mouth daily.  amLODIPine (NORVASC) 5 mg tablet Take 1 Tab by mouth daily.  cholecalciferol, vitamin D3, (VITAMIN D3) 2,000 unit tab Take 1 Tab by mouth daily. No current facility-administered medications for this visit. Health Maintenance Topic Date Due  
 DTaP/Tdap/Td series (1 - Tdap) 05/07/1987  Shingrix Vaccine Age 50> (1 of 2) 05/07/2016  FOBT Q 1 YEAR AGE 50-75  05/18/2019  BREAST CANCER SCRN MAMMOGRAM  06/25/2019  Influenza Age 5 to Adult  08/01/2019  PAP AKA CERVICAL CYTOLOGY  04/11/2021  Pneumococcal 0-64 years  Aged Out Immunization History Administered Date(s) Administered  Influenza Vaccine (Quad) Mdck Pf 11/09/2018 Allergies and Medications: Reviewed and updated in EMR. Patient Active Problem List  
Diagnosis Code  Morbid obesity ([facility] [address] E66.01  
 Hypertension I10  Family history of diabetes mellitus (DM) Z83.3  Pre-diabetes R73.03  
 Mixed hyperlipidemia E78.2  Thrombocytosis (HCC) D47.3  Allergic rhinitis J30.9  Vitamin D deficiency E55.9  Severe obesity (BMI 35.0-35.9 with comorbidity) (HCC) E66.01, Z68.35  
 Injury of meniscus of right knee F81.8B4K Family History, Social History, Past Medical History, Surgical History: Reviewed and updated in EMR as appropriate. OBJECTIVE:  
Visit Vitals /87 (BP 1 Location: Right arm, BP Patient Position: Sitting) Pulse 93 Temp 97.4 °F (36.3 °C) (Oral) Resp 17 Ht 5' 1\" (1.549 m) Wt 205 lb (93 kg) SpO2 100% BMI 38.73 kg/m² BP Readings from Last 3 Encounters:  
05/20/19 137/87  
02/04/19 (!) 147/107  
01/14/19 147/89 Wt Readings from Last 3 Encounters:  
05/20/19 205 lb (93 kg) 02/04/19 209 lb 3.2 oz (94.9 kg) 01/14/19 200 lb (90.7 kg) General: Pleasant adult woman in no acute distress HEENT: Head is atraumatic normo-cephalic CVS: Heart regular, rate, and rhythm. Audible S1 and S2. No murmurs, rubs or gallops. PULM: Lungs clear to auscultation bilaterally. No wheezes, rales or rhonchi. GI: Positive bowel sounds, soft, nondistended, non-tender. EXT: 2+ dorsalis pedis pulses bilaterally. No pedal edema bilaterally Neuro: Alert and oriented x3. MSE: Mood euthymic, affect congruent and reactive. MSK: 
 RIGHT KNEE Appearance: No swelling no redness Mild warmth over medial joint line. Palpation Positive tenderness over medial joint lines, No tenderness over lateral joint line, patellar tendon nor over quad tendon Neuro: Light touch intact Reflexes: patellar depressed on right, intact on left. ROM intact Function: Gait wnl Squat limited 2/2 pain Strength: 5/5 quad, 5/5 hamstring, 5/5 hip flexion Joint stability intact 
  
Provocative testing:  
Valgus, varus stress test: no laxity. Anterior drawer test no laxity McMurrey Test: Positive pain Posterior drawer test: no laxity Thessaly test: Positive pain Patellar grind Negative Nursing Notes Reviewed. LABS/RADIOLOGICAL TESTS: 
 
Lab Results Component Value Date/Time WBC 15.4 (H) 01/14/2019 01:40 AM  
 HGB 12.8 (L) 01/14/2019 01:40 AM  
 HCT 38.7 01/14/2019 01:40 AM  
 PLATELET 031 94/20/4915 01:40 AM  
 
Lab Results Component Value Date/Time Sodium 142 01/14/2019 01:40 AM  
 Potassium 3.5 01/14/2019 01:40 AM  
 Chloride 108 (H) 01/14/2019 01:40 AM  
 CO2 26 01/14/2019 01:40 AM  
 Glucose 102 01/14/2019 01:40 AM  
 BUN 12 01/14/2019 01:40 AM  
 Creatinine 0.9 01/14/2019 02:20 AM  
 
Lab Results Component Value Date/Time Cholesterol, total 175 05/16/2018 09:50 AM  
 HDL Cholesterol 47 05/16/2018 09:50 AM  
 LDL, calculated 113 (H) 05/16/2018 09:50 AM  
 Triglyceride 76 05/16/2018 09:50 AM  
 
5/17/2018  9:40 AM - [place], [facility] Lab Results In Component Value Flag Ref Range Units Status Hemoglobin A1c 5.8  High   4.8 - 5.6 % Final  
 
All lab results and radiological studies were reviewed and discussed with the patient. ASSESSMENT/PLAN:   
  ICD-10-CM ICD-9-CM 1. Right knee pain, unspecified chronicity M25.561 719.46 XR KNEE RT MIN 4 V Given handout on meiscus injury and exerciseis Naproxen 500 mg BID prn  
2. Thrombocytosis (HCC) D47.3 238.71 CBC WITH AUTOMATED DIFF 3. Pre-diabetes R73.03 790.29 HEMOGLOBIN A1C WITH EAG  
   COLLECTION VENOUS BLOOD,VENIPUNCTURE  
5. Mixed hyperlipidemia E78.2 272.2 LIPID PANEL  
   COLLECTION VENOUS BLOOD,VENIPUNCTURE 6. Essential hypertension I10 401.9 hydroCHLOROthiazide (HYDRODIURIL) 12.5 mg tablet amLODIPine (NORVASC) 5 mg tablet METABOLIC PANEL, COMPREHENSIVE  
   TSH 3RD GENERATION 7. Severe obesity (BMI 35.0-35.9 with comorbidity) ([facility]) E66.01 278.01 Diet and exercise plan discussed BMI education provided Z68.35 V85.35   
8. Colon cancer screening Z12.11 V76.51 OCCULT BLOOD IMMUNOASSAY,DIAGNOSTIC 9. Breast cancer screening Z12.31 V76.10 BUTCH MAMMO BI SCREENING INCL CAD 10. Vitamin D deficiency E55.9 268.9 VITAMIN D, 25 HYDROXY  
   COLLECTION VENOUS BLOOD,VENIPUNCTURE 11. Injury of meniscus of right knee, initial encounter S83. 8X1A 959.7 MRI KNEE RT W WO CONT Requested Prescriptions Signed Prescriptions Disp Refills  hydroCHLOROthiazide (HYDRODIURIL) 12.5 mg tablet 90 Tab 3 Sig: Take 1 Tab by mouth daily.  amLODIPine (NORVASC) 5 mg tablet 90 Tab 3 Sig: Take 1 Tab by mouth daily. Patient verbalized understanding and agreement with the plan. Patient was given an after-visit summary. Follow-up and Dispositions · Return in about 5 weeks (around 6/24/2019) for knee pain. RTC in 5 weeks f/u knee pain or sooner if worsening symptoms. More than 50% of this 40 min visit was spent counseling the patient face to face about etiology and treatment of health conditions outlined in assessment and plan. Gita Holcomb M.D. [address], [address] [place], [address] [place] [address] - [phone] Fax - [phone] or [phone]

## 2019-05-20 NOTE — PROGRESS NOTES
Allie Doty is a 49 y.o. female presents in office for Chief Complaint Patient presents with  Knee Pain  
  right knee pain; pain started on 4/5/19 Health Maintenance Due Topic Date Due  
 DTaP/Tdap/Td series (1 - Tdap) 05/07/1987  Shingrix Vaccine Age 50> (1 of 2) 05/07/2016  FOBT Q 1 YEAR AGE 50-75  05/18/2019  BREAST CANCER SCRN MAMMOGRAM  06/25/2019 1. Have you been to the ER, urgent care clinic since your last visit? Hospitalized since your last visit? No 
 
2. Have you seen or consulted any other health care providers outside of the [address] [place] [address] since your last visit? Include any pap smears or colon screening. No  
 
3 most recent PHQ Screens 5/20/2019 Little interest or pleasure in doing things Not at all Feeling down, depressed, irritable, or hopeless Not at all Total Score PHQ 2 0 Abuse Screening Questionnaire 5/20/2019 Do you ever feel afraid of your partner? Grisel Mallory Are you in a relationship with someone who physically or mentally threatens you? Grisel Mallory Is it safe for you to go home? Jay Jay Monroy Fall Risk Assessment, last 12 mths 5/20/2019 Able to walk? Yes Fall in past 12 months? No  
 
Learning Assessment 4/20/2018 PRIMARY LEARNER Patient HIGHEST LEVEL OF EDUCATION - PRIMARY LEARNER  SOME COLLEGE  
BARRIERS PRIMARY LEARNER NONE  
CO-LEARNER CAREGIVER No  
PRIMARY LANGUAGE ENGLISH  
LEARNER PREFERENCE PRIMARY OTHER (COMMENT) ANSWERED BY PATIENT   
RELATIONSHIP SELF

## 2019-05-20 NOTE — PROGRESS NOTES
Patient presents for lab draw ordered by: 
 
Ordering Provider:  Dr. Lida Lee Ordering Department/Practice:  [address] Phone:  [phone] Date Ordered:  5/20/19 The following labs were drawn and sent to [employer] by Maritza Brunson LPN: 
 
CBC, Lipid Profile, CMP, TSH, 3rd Generation, HgA1C and Vitamin D, 25 Hydroxy The following tubes were sent: 
 
Lavender  ( 1) SST (1) L Antecubital cleansed with aseptic technique. Specimen obtained by using a 21 gauge butterfly needle with 1 attempt. Patient tolerated well and voiced no complaints.

## 2019-05-21 LAB
25(OH)D3+25(OH)D2 SERPL-MCNC: 16.8 NG/ML (ref 30–100)
ALBUMIN SERPL-MCNC: 4.1 G/DL (ref 3.5–5.5)
ALBUMIN/GLOB SERPL: 1.2 {RATIO} (ref 1.2–2.2)
ALP SERPL-CCNC: 123 IU/L (ref 39–117)
ALT SERPL-CCNC: 14 IU/L (ref 0–32)
AST SERPL-CCNC: 26 IU/L (ref 0–40)
BASOPHILS # BLD AUTO: 0 X10E3/UL (ref 0–0.2)
BASOPHILS NFR BLD AUTO: 0 %
BILIRUB SERPL-MCNC: 0.3 MG/DL (ref 0–1.2)
BUN SERPL-MCNC: 14 MG/DL (ref 6–24)
BUN/CREAT SERPL: 15 (ref 9–23)
CALCIUM SERPL-MCNC: 9.6 MG/DL (ref 8.7–10.2)
CHLORIDE SERPL-SCNC: 101 MMOL/L (ref 96–106)
CHOLEST SERPL-MCNC: 193 MG/DL (ref 100–199)
CO2 SERPL-SCNC: 22 MMOL/L (ref 20–29)
CREAT SERPL-MCNC: 0.96 MG/DL (ref 0.57–1)
EOSINOPHIL # BLD AUTO: 0.1 X10E3/UL (ref 0–0.4)
EOSINOPHIL NFR BLD AUTO: 1 %
ERYTHROCYTE [DISTWIDTH] IN BLOOD BY AUTOMATED COUNT: 15.6 % (ref 12.3–15.4)
EST. AVERAGE GLUCOSE BLD GHB EST-MCNC: 123 MG/DL
GLOBULIN SER CALC-MCNC: 3.4 G/DL (ref 1.5–4.5)
GLUCOSE SERPL-MCNC: 87 MG/DL (ref 65–99)
HBA1C MFR BLD: 5.9 % (ref 4.8–5.6)
HCT VFR BLD AUTO: 37.9 % (ref 34–46.6)
HDLC SERPL-MCNC: 51 MG/DL
HGB BLD-MCNC: 12.2 G/DL (ref 11.1–15.9)
IMM GRANULOCYTES # BLD AUTO: 0 X10E3/UL (ref 0–0.1)
IMM GRANULOCYTES NFR BLD AUTO: 0 %
LDLC SERPL CALC-MCNC: 125 MG/DL (ref 0–99)
LYMPHOCYTES # BLD AUTO: 2.1 X10E3/UL (ref 0.7–3.1)
LYMPHOCYTES NFR BLD AUTO: 27 %
MCH RBC QN AUTO: 25.6 PG (ref 26.6–33)
MCHC RBC AUTO-ENTMCNC: 32.2 G/DL (ref 31.5–35.7)
MCV RBC AUTO: 80 FL (ref 79–97)
MONOCYTES # BLD AUTO: 0.4 X10E3/UL (ref 0.1–0.9)
MONOCYTES NFR BLD AUTO: 5 %
NEUTROPHILS # BLD AUTO: 5.4 X10E3/UL (ref 1.4–7)
NEUTROPHILS NFR BLD AUTO: 67 %
PLATELET # BLD AUTO: 317 X10E3/UL (ref 150–450)
POTASSIUM SERPL-SCNC: 4.5 MMOL/L (ref 3.5–5.2)
PROT SERPL-MCNC: 7.5 G/DL (ref 6–8.5)
RBC # BLD AUTO: 4.76 X10E6/UL (ref 3.77–5.28)
SODIUM SERPL-SCNC: 139 MMOL/L (ref 134–144)
TRIGL SERPL-MCNC: 83 MG/DL (ref 0–149)
TSH SERPL DL<=0.005 MIU/L-ACNC: 1.5 UIU/ML (ref 0.45–4.5)
VLDLC SERPL CALC-MCNC: 17 MG/DL (ref 5–40)
WBC # BLD AUTO: 8 X10E3/UL (ref 3.4–10.8)

## 2019-05-22 ENCOUNTER — TELEPHONE (OUTPATIENT)
Dept: FAMILY MEDICINE CLINIC | Age: 54
End: 2019-05-22

## 2019-05-22 DIAGNOSIS — E78.00 ELEVATED LDL CHOLESTEROL LEVEL: ICD-10-CM

## 2019-05-22 DIAGNOSIS — S83.8X1A INJURY OF MENISCUS OF RIGHT KNEE, INITIAL ENCOUNTER: Primary | ICD-10-CM

## 2019-05-22 DIAGNOSIS — R73.03 PREDIABETES: ICD-10-CM

## 2019-05-22 DIAGNOSIS — E55.9 VITAMIN D DEFICIENCY: Primary | ICD-10-CM

## 2019-05-22 PROBLEM — D75.839 THROMBOCYTOSIS: Status: RESOLVED | Noted: 2018-05-22 | Resolved: 2019-05-22

## 2019-05-22 RX ORDER — ERGOCALCIFEROL 1.25 MG/1
50000 CAPSULE ORAL
Qty: 4 CAP | Refills: 2 | Status: SHIPPED | OUTPATIENT
Start: 2019-05-22 | End: 2019-08-20

## 2019-05-22 NOTE — TELEPHONE ENCOUNTER
LPN please call patient and inform of lab results significant for below    VITAMIN D DEFICIENCY: start taking vitamin d 50,000 units weekly for 12 weeks, then start over the counter vitamin d 3 3,000 units daily. YOU HAVE PREDIABETES: This puts you at risk for developing diabetes. We treat first with low carb diet for weight loss and exercise. For High Cholesterol: This increases your risk of heart attacks and strokes. Please work on low animal fat diet including avoiding red meat, cheese (except cottage cheese), high fat yogurts, chicken fat. Okay to eat fish and skinless chicken as well as protein from beans, nuts. Try to eat more vegetables and exercise 30 minutes a day. Okay to take fish oil 1 gram per day to improve cholesterol levels.

## 2019-05-22 NOTE — TELEPHONE ENCOUNTER
[facility] Pick[facility] to speak with Dr Marta Ordaz regarding MRI order. States ordered incorrectly.

## 2019-05-22 NOTE — TELEPHONE ENCOUNTER
- Continue ketoconazole shampoo, keep up good hygiene  - Great job with asthma control  - 10 ear drops, twice a day, for 14 days  - Return to clinic if any concerns or questions   Returned call to [facility], no answer, left VM for Rachel Apodaca to call back and leave specific information regarding the correct order needed for MRI of knee to evaluate for meniscal injury. Sorin Gonzalez [address], [address] [place] [address]   [facility]  -[phone]

## 2019-05-24 ENCOUNTER — TELEPHONE (OUTPATIENT)
Dept: FAMILY MEDICINE CLINIC | Age: 54
End: 2019-05-24

## 2019-05-24 DIAGNOSIS — S83.8X1A INJURY OF MENISCUS OF RIGHT KNEE, INITIAL ENCOUNTER: ICD-10-CM

## 2019-05-24 DIAGNOSIS — E78.2 MIXED HYPERLIPIDEMIA: ICD-10-CM

## 2019-05-24 DIAGNOSIS — M25.561 RIGHT KNEE PAIN, UNSPECIFIED CHRONICITY: Primary | ICD-10-CM

## 2019-05-24 NOTE — TELEPHONE ENCOUNTER
Left message for patient to call back. Need to inform patient of lab results detailed in providers note.

## 2019-05-24 NOTE — TELEPHONE ENCOUNTER
Received a call from central scheduling wanting to know if provider wanted the MRI knee with and without contrast or just MRI knee without contrast.  Was informed that the MRI with and without contrast would be with an arthrogram and needed clarification. Dr Alexei Canchola informed and is ordering the test she wants completed. Was informed by central scheduling that there is no need to fax the order because it will be in [facility].

## 2019-05-30 ENCOUNTER — LAB ONLY (OUTPATIENT)
Dept: FAMILY MEDICINE CLINIC | Age: 54
End: 2019-05-30

## 2019-06-03 LAB — HEMOCCULT STL QL IA: NEGATIVE

## 2019-06-10 DIAGNOSIS — I10 ESSENTIAL HYPERTENSION: ICD-10-CM

## 2019-06-10 RX ORDER — AMLODIPINE BESYLATE 5 MG/1
5 TABLET ORAL DAILY
Qty: 90 TAB | Refills: 3 | Status: SHIPPED | OUTPATIENT
Start: 2019-06-10 | End: 2020-06-09

## 2019-06-27 ENCOUNTER — OFFICE VISIT (OUTPATIENT)
Dept: FAMILY MEDICINE CLINIC | Age: 54
End: 2019-06-27

## 2019-06-27 VITALS
TEMPERATURE: 97.1 F | RESPIRATION RATE: 18 BRPM | HEART RATE: 93 BPM | DIASTOLIC BLOOD PRESSURE: 89 MMHG | HEIGHT: 61 IN | SYSTOLIC BLOOD PRESSURE: 131 MMHG | OXYGEN SATURATION: 96 % | WEIGHT: 205 LBS | BODY MASS INDEX: 38.71 KG/M2

## 2019-06-27 DIAGNOSIS — R73.03 PRE-DIABETES: ICD-10-CM

## 2019-06-27 DIAGNOSIS — E78.00 ELEVATED LDL CHOLESTEROL LEVEL: ICD-10-CM

## 2019-06-27 DIAGNOSIS — E66.01 SEVERE OBESITY (BMI 35.0-35.9 WITH COMORBIDITY) (HCC): ICD-10-CM

## 2019-06-27 DIAGNOSIS — M25.561 RIGHT KNEE PAIN, UNSPECIFIED CHRONICITY: ICD-10-CM

## 2019-06-27 DIAGNOSIS — S83.8X1D INJURY OF MENISCUS OF RIGHT KNEE, SUBSEQUENT ENCOUNTER: Primary | ICD-10-CM

## 2019-06-27 DIAGNOSIS — E55.9 VITAMIN D DEFICIENCY: ICD-10-CM

## 2019-06-27 NOTE — PATIENT INSTRUCTIONS
PLEASE CALL TO SCHEDULE MRI OF KNEE [phone] ASK FOR PROCEDURE TO BE COMPLETED AT [place]      FOR WEIGHT:  Continue to work on low sugar/carbohydrate diet for weight loss  Exercise 30 minutes a day 4-5 days week walking laps or swimming laps in pool      Learning About Low-Carbohydrate Diets for Weight Loss  What is a low-carbohydrate diet? Low-carb diets avoid foods that are high in carbohydrate. These high-carb foods include pasta, bread, rice, cereal, fruits, and starchy vegetables. Instead, these diets usually have you eat foods that are high in fat and protein. Many people lose weight quickly on a low-carb diet. But the early weight loss is water. People on this diet often gain the weight back after they start eating carbs again. Not all diet plans are safe or work well. A lot of the evidence shows that low-carb diets aren't healthy. That's because these diets often don't include healthy foods like fruits and vegetables. Losing weight safely means balancing protein, fat, and carbs with every meal and snack. And low-carb diets don't always provide the vitamins, minerals, and fiber you need. If you have a serious medical condition, talk to your doctor before you try any diet. These conditions include kidney disease, heart disease, type 2 diabetes, high cholesterol, and high blood pressure. If you are pregnant, it may not be safe for your baby if you are on a low-carb diet. How can you lose weight safely? You might have heard that a diet plan helped another person lose weight. But that doesn't mean that it will work for you. It is very hard to stay on a diet that includes lots of big changes in your eating habits. If you want to get to a healthy weight and stay there, making healthy lifestyle changes will often work better than dieting. These steps can help. · Make a plan for change. Work with your doctor to create a plan that is right for you. · See a dietitian.  He or she can show you how to make healthy changes in your eating habits. · Manage stress. If you have a lot of stress in your life, it can be hard to focus on making healthy changes to your daily habits. · Track your food and activity. You are likely to do better at losing weight if you keep track of what you eat and what you do. Follow-up care is a key part of your treatment and safety. Be sure to make and go to all appointments, and call your doctor if you are having problems. It's also a good idea to know your test results and keep a list of the medicines you take. Where can you learn more? Go to http://mahesh-mindy.info/. Enter A121 in the search box to learn more about \"Learning About Low-Carbohydrate Diets for Weight Loss. \"  Current as of: December 8, 2016  Content Version: 11.3  © 9234-2969 [employer]. Care instructions adapted under license by [employer] (which disclaims liability or warranty for this information). If you have questions about a medical condition or this instruction, always ask your healthcare professional. [address] any warranty or liability for your use of this information. Body Mass Index: Care Instructions  Your Care Instructions    Body mass index (BMI) can help you see if your weight is raising your risk for health problems. It uses a formula to compare how much you weigh with how tall you are. · A BMI lower than 18.5 is considered underweight. · A BMI between 18.5 and 24.9 is considered healthy. · A BMI between 25 and 29.9 is considered overweight. A BMI of 30 or higher is considered obese. If your BMI is in the normal range, it means that you have a lower risk for weight-related health problems. If your BMI is in the overweight or obese range, you may be at increased risk for weight-related health problems, such as high blood pressure, heart disease, stroke, arthritis or joint pain, and diabetes.  If your BMI is in the underweight range, you may be at increased risk for health problems such as fatigue, lower protection (immunity) against illness, muscle loss, bone loss, hair loss, and hormone problems. BMI is just one measure of your risk for weight-related health problems. You may be at higher risk for health problems if you are not active, you eat an unhealthy diet, or you drink too much alcohol or use tobacco products. Follow-up care is a key part of your treatment and safety. Be sure to make and go to all appointments, and call your doctor if you are having problems. It's also a good idea to know your test results and keep a list of the medicines you take. How can you care for yourself at home? · Practice healthy eating habits. This includes eating plenty of fruits, vegetables, whole grains, lean protein, and low-fat dairy. · If your doctor recommends it, get more exercise. Walking is a good choice. Bit by bit, increase the amount you walk every day. Try for at least 30 minutes on most days of the week. · Do not smoke. Smoking can increase your risk for health problems. If you need help quitting, talk to your doctor about stop-smoking programs and medicines. These can increase your chances of quitting for good. · Limit alcohol to 2 drinks a day for men and 1 drink a day for women. Too much alcohol can cause health problems. If you have a BMI higher than 25  · Your doctor may do other tests to check your risk for weight-related health problems. This may include measuring the distance around your waist. A waist measurement of more than 40 inches in men or 35 inches in women can increase the risk of weight-related health problems. · Talk with your doctor about steps you can take to stay healthy or improve your health. You may need to make lifestyle changes to lose weight and stay healthy, such as changing your diet and getting regular exercise.   If you have a BMI lower than 18.5  · Your doctor may do other tests to check your risk for health problems. · Talk with your doctor about steps you can take to stay healthy or improve your health. You may need to make lifestyle changes to gain or maintain weight and stay healthy, such as getting more healthy foods in your diet and doing exercises to build muscle. Where can you learn more? Go to http://mahesh-mindy.info/. Enter S176 in the search box to learn more about \"Body Mass Index: Care Instructions. \"  Current as of: October 13, 2016  Content Version: 11.4  © 7947-5590 [employer]. Care instructions adapted under license by [employer] (which disclaims liability or warranty for this information). If you have questions about a medical condition or this instruction, always ask your healthcare professional. [address] any warranty or liability for your use of this information.

## 2019-06-27 NOTE — PROGRESS NOTES
Jammie Bassett is a 49 y.o. female presents in office for    Chief Complaint   Patient presents with    Knee Pain     right knee; follow up        Visit Vitals  /89   Pulse 93   Temp 97.1 °F (36.2 °C) (Oral)   Resp 18   Ht 5' 1\" (1.549 m)   Wt 205 lb (93 kg)   SpO2 96%   BMI 38.73 kg/m²         Health Maintenance Due   Topic Date Due    DTaP/Tdap/Td series (1 - Tdap) 05/07/1987    Shingrix Vaccine Age 50> (1 of 2) 05/07/2016    BREAST CANCER SCRN MAMMOGRAM  06/25/2019         1. Have you been to the ER, urgent care clinic since your last visit? Hospitalized since your last visit? No    2. Have you seen or consulted any other health care providers outside of the [address] [place] [address] since your last visit? Include any pap smears or colon screening.  No     Learning Assessment 4/20/2018   PRIMARY LEARNER Patient   HIGHEST LEVEL OF EDUCATION - PRIMARY LEARNER  SOME COLLEGE   BARRIERS PRIMARY LEARNER NONE   CO-LEARNER CAREGIVER No   PRIMARY LANGUAGE ENGLISH   LEARNER PREFERENCE PRIMARY OTHER (COMMENT)   ANSWERED BY PATIENT    RELATIONSHIP SELF

## 2019-06-27 NOTE — PROGRESS NOTES
Internal Medicine Follow Up Visit Note    Chief Complaint   Patient presents with    Knee Pain     right knee; follow up       HPI:  Carmen Romero is a 49 y.o.  female with history significant for HTN, PreDM, Obesity, HLD is here for the above complaint(s). Right knee pain: At last reported the following \"onset 4/5/2019 was helping move furniture and turned and twisted knee, heard a \"snap\" and excruciating pain running up knee. intermittent pain with tightening with decreased flexion since then. Feels \"snapping out\" no swelling. No numbness or tingling in feet. Chronic weakness in legs per patient. Worse with turning the wrong way and extending leg and will last for days. Aleve eases pressure some, able to be functional.\" Since last visit knee pain did have mild improvement from naproxen but decreased use of Rx 2/2 to funny feeling in stomach. Now pain a little bit worse. Doing knee exercises. With some temporary minimal relief. Xray negative. VITAMIN D DEFICIENCY: Started taking vitamin d 50,000 units weekly. PREDIABETES: working on low carb diet.     High Cholesterol: Working on low animal fat diet. Has not started fish oil. Obesity: Weight unchanged since last visit. Recently returned to work 6/6/2019. Current Outpatient Medications   Medication Sig    amLODIPine (NORVASC) 5 mg tablet Take 1 Tab by mouth daily.  ergocalciferol (ERGOCALCIFEROL) 50,000 unit capsule Take 1 Cap by mouth every seven (7) days for 90 days.  loratadine (CLARITIN) 10 mg tablet Take 10 mg by mouth.  hydroCHLOROthiazide (HYDRODIURIL) 12.5 mg tablet Take 1 Tab by mouth daily.  naproxen (NAPROSYN) 500 mg tablet Take 1 Tab by mouth two (2) times daily (with meals) for 90 days. No current facility-administered medications for this visit.       Health Maintenance   Topic Date Due    DTaP/Tdap/Td series (1 - Tdap) 05/07/1987    Shingrix Vaccine Age 50> (1 of 2) 05/07/2016    BREAST CANCER SCRN MAMMOGRAM  06/25/2019    Influenza Age 5 to Adult  08/01/2019    FOBT Q 1 YEAR AGE 50-75  05/30/2020    PAP AKA CERVICAL CYTOLOGY  04/11/2021    Pneumococcal 0-64 years  Aged [employer] History   Administered Date(s) Administered    Influenza Vaccine (Quad) Mdck Pf 11/09/2018       Allergies and Medications: Reviewed and updated in EMR. Patient Active Problem List   Diagnosis Code    Morbid obesity ([facility] [address] E66.01    Hypertension I10    Family history of diabetes mellitus (DM) Z83.3    Prediabetes R73.03    Mixed hyperlipidemia E78.2    Allergic rhinitis J30.9    Vitamin D deficiency E55.9    Severe obesity (BMI 35.0-35.9 with comorbidity) ([facility]) E66.01, Z68.35    Injury of meniscus of right knee S83.8X1A    Right knee pain M25.561    Elevated LDL cholesterol level E78.00       Family History, Social History, Past Medical History, Surgical History: Reviewed and updated in EMR as appropriate. OBJECTIVE:   Visit Vitals  /89   Pulse 93   Temp 97.1 °F (36.2 °C) (Oral)   Resp 18   Ht 5' 1\" (1.549 m)   Wt 205 lb (93 kg)   SpO2 96%   BMI 38.73 kg/m²        BP Readings from Last 3 Encounters:   06/27/19 131/89   05/20/19 137/87   02/04/19 (!) 147/107     Wt Readings from Last 3 Encounters:   06/27/19 205 lb (93 kg)   05/20/19 205 lb (93 kg)   02/04/19 209 lb 3.2 oz (94.9 kg)       General: Pleasant adult woman in no acute distress  MSK:   RIGHT KNEE  Appearance: No swelling no redness Mild warmth over medial joint line. Palpation Positive tenderness over medial joint lines, No tenderness over lateral joint line, patellar tendon nor over quad tendon  Neuro: Light touch intact Reflexes: patellar depressed on right, intact on left. ROM intact  Function: Gait wnl Squat limited 2/2 pain  Strength: 5/5 quad, 5/5 hamstring, 5/5 hip flexion  Joint stability intact     Provocative testing:   Valgus, varus stress test: no laxity.   Anterior drawer test no laxity  McMurrey Test: Positive pain  Posterior drawer test: no laxity  Thessaly test: Positive pain  Patellar grind Negative      Nursing Notes Reviewed. LABS/RADIOLOGICAL TESTS:    Lab Results   Component Value Date/Time    WBC 8.0 05/20/2019 10:05 AM    HGB 12.2 05/20/2019 10:05 AM    HCT 37.9 05/20/2019 10:05 AM    PLATELET 720 40/03/6170 10:05 AM     Lab Results   Component Value Date/Time    Sodium 139 05/20/2019 10:05 AM    Potassium 4.5 05/20/2019 10:05 AM    Chloride 101 05/20/2019 10:05 AM    CO2 22 05/20/2019 10:05 AM    Glucose 87 05/20/2019 10:05 AM    BUN 14 05/20/2019 10:05 AM    Creatinine 0.96 05/20/2019 10:05 AM     Lab Results   Component Value Date/Time    Cholesterol, total 193 05/20/2019 10:05 AM    HDL Cholesterol 51 05/20/2019 10:05 AM    LDL, calculated 125 (H) 05/20/2019 10:05 AM    Triglyceride 83 05/20/2019 10:05 AM     5/21/2019 10:27 AM - [facility], [facility] Lab Results In     Component Value Flag Ref Range Units Status   Hemoglobin A1c 5.9  High   4.8 - 5.6 % Final     5/21/2019 10:27 AM - [place], [facility] Lab Results In     Component Value Flag Ref Range Units Status   VITAMIN D, 25-HYDROXY 16.8  Low   30.0 - 100.0 ng/mL Final       Results   XR KNEE RT MIN 4 V (Accession [id number]) (Order [id number])   Allergies      Not Specified: Citric Acid;  Cristina;  Red Dye;  Strawberry   Exam Information     Status Exam Begun  Exam Ended    Final [99] 5/20/2019 09:28 5/20/2019 09:36   Result Information     Status: Final result (Exam End: 5/20/2019 09:36) Provider Status: Reviewed   Study Result     EXAM: XR KNEE RT MIN 4 V     CLINICAL HISTORY/INDICATION: Right knee pain  -Additional: None     COMPARISON: None     TECHNIQUE:  Four views of the right knee were performed.       _______________     FINDINGS: There is no significant bone, joint or soft tissue abnormality.     _______________     IMPRESSION  IMPRESSION:     Normal examination.        All lab results and radiological studies were reviewed and discussed with the patient. ASSESSMENT/PLAN:      ICD-10-CM ICD-9-CM    1. Injury of meniscus of right knee, subsequent encounter S83.8X1D V58.89 MRI KNEE RT WO CONT  Given number to call to schedule at [place]  Continue exercise  NSAID prn  Rest knee as much as possible  RTC in 1 month, if pain persistent consider PT vs ortho referral pending results of MRI     959.7    2. Severe obesity (BMI 35.0-35.9 with comorbidity) ([facility]) E66.01 278.01 Diet and exercise plan discussed  BMI education provided\    Z68.35 V85.35    3. Right knee pain, unspecified chronicity M25.561 719.46 MRI KNEE RT WO CONT   4. Vitamin D deficiency E55.9 268.9 Continue supplements  Recheck in 3 monthse   5. Elevated LDL cholesterol level E78.00 272.0 Encouraged fish oil  Continue working on diet and weight loss   6. Pre-diabetes R73.03 790.29 Continue diet and weight loss recommendaitons       Requested Prescriptions      No prescriptions requested or ordered in this encounter     Patient verbalized understanding and agreement with the plan. Patient was given an after-visit summary. Follow-up and Dispositions    · Return in about 1 month (around 7/25/2019) for knee pain,. or sooner if worsening symptoms. More than 50% of this 40 min visit was spent counseling the patient face to face about etiology and treatment of health conditions outlined in assessment and plan. Dain Mack M.D.   [address], [address] [place] [address], [address] [place], [place] [id number] [phone]  [place] [phone]

## 2019-07-11 ENCOUNTER — HOSPITAL ENCOUNTER (OUTPATIENT)
Age: 54
Discharge: HOME | End: 2019-07-11
Attending: INTERNAL MEDICINE
Payer: COMMERCIAL

## 2019-07-11 ENCOUNTER — HOSPITAL ENCOUNTER (OUTPATIENT)
Dept: MAMMOGRAPHY | Age: 54
Discharge: HOME | End: 2019-07-11
Attending: INTERNAL MEDICINE
Payer: COMMERCIAL

## 2019-07-11 DIAGNOSIS — Z12.39 BREAST CANCER SCREENING: ICD-10-CM

## 2019-07-11 DIAGNOSIS — S83.8X1D INJURY OF MENISCUS OF RIGHT KNEE, SUBSEQUENT ENCOUNTER: ICD-10-CM

## 2019-07-11 DIAGNOSIS — Z12.31 VISIT FOR SCREENING MAMMOGRAM: ICD-10-CM

## 2019-07-11 DIAGNOSIS — M25.561 RIGHT KNEE PAIN, UNSPECIFIED CHRONICITY: ICD-10-CM

## 2019-07-11 PROCEDURE — 77063 BREAST TOMOSYNTHESIS BI: CPT

## 2019-07-11 PROCEDURE — 73721 MRI JNT OF LWR EXTRE W/O DYE: CPT

## 2019-07-18 ENCOUNTER — TELEPHONE (OUTPATIENT)
Dept: FAMILY MEDICINE CLINIC | Age: 54
End: 2019-07-18

## 2019-07-18 NOTE — TELEPHONE ENCOUNTER
Please call patient and let her know that her MRI of her knee showed a meniscal tear and edema. Note from Dr. Jhonatan Lynn states that she was given number to [facility], not sure if this was for the MRI or for ortho referral. She needs to see ortho, so I can send referral if she needs it. TAYLOR Fenton [facility]. [phone]  [address]

## 2019-07-18 NOTE — TELEPHONE ENCOUNTER
Patient informed of MRI results as detailed in providers note. Patient states that her insurance company does not require referrals. Informed patient to call back if she needs any information to be sent to her Orthopedic. Patient verbalized understanding of information given.

## 2019-10-09 ENCOUNTER — OFFICE VISIT (OUTPATIENT)
Dept: FAMILY MEDICINE CLINIC | Age: 54
End: 2019-10-09

## 2019-10-09 VITALS
SYSTOLIC BLOOD PRESSURE: 126 MMHG | BODY MASS INDEX: 40.02 KG/M2 | RESPIRATION RATE: 18 BRPM | HEART RATE: 93 BPM | DIASTOLIC BLOOD PRESSURE: 78 MMHG | WEIGHT: 212 LBS | TEMPERATURE: 97 F | HEIGHT: 61 IN | OXYGEN SATURATION: 99 %

## 2019-10-09 DIAGNOSIS — I10 ESSENTIAL HYPERTENSION: ICD-10-CM

## 2019-10-09 DIAGNOSIS — S83.241A ACUTE MEDIAL MENISCUS TEAR OF RIGHT KNEE, INITIAL ENCOUNTER: Primary | ICD-10-CM

## 2019-10-09 NOTE — PROGRESS NOTES
Chief Complaint   Patient presents with    Knee Pain     Right knee injury, MRI results     . Katiana Holman 1. Have you been to the ER, urgent care clinic since your last visit? Hospitalized since your last visit? No    2. Have you seen or consulted any other health care providers outside of the [address] [place] [address] since your last visit? Include any pap smears or colon screening.  No

## 2019-10-09 NOTE — PROGRESS NOTES
Patient: Mabelene Hatchet  Date of Service: 10/9/2019   Provider: Edgar Pedro PA-C     REASON FOR VISIT:   Chief Complaint   Patient presents with    Knee Pain     Right knee injury, MRI results        HISTORY OF PRESENT ILLNESS:   Mabelene Hatchet is a 49 y.o. female who presents to the office for acute care. Right Knee Pain  Patient with ongoing right knee pain. She has had knee pain for over 6 months. Previous PCP ordered MRI in July. Patient states that she has knee pain when she steps wrong onto her foot. She does also endorse instability of her right knee as well. Patient states that she felt her knee pain was improving when she was more active. She has been less active in the last month and has noticed increasing knee pain. Patient denies any numbness or tingling of her right knee or leg. Patient has not been in for MRI results. MRI of right knee:  HISTORY: Pain.     Large undersurface oblique tear or flap tear in most of the posterior horn  medial meniscus. Fraying of the inner edge in the meniscal body. Mild medial  subluxation medial meniscus. Severe fraying of the posterior medial condylar  cartilage along the lateral side. MCL is intact.     Cruciate ligaments are intact.     Lateral meniscus is intact. Subchondral edema with moderately severe cartilage  fraying at the posterior lateral condyle. Trochlear cartilage is intact. Lateral  collateral ligament complex is intact.     Small joint effusion and small popliteal cyst. Significant artifact from the  patellar cartilage. Suspect subchondral edema along the medial facet with  cartilage fraying and potential calcification. Retinacula are intact with mild  edema along the lateral retinaculum. Extensor mechanism is intact with mild  prepatellar soft tissue edema.     No pathologic marrow signal otherwise.     TT TG distance 4.5 mm     IMPRESSION  IMPRESSION:  1.  Large undersurface flap tear in most of the posterior horn medial meniscus. Fraying of the inner edge in the meniscal body. Cartilage fraying of the lateral  aspect of the posterior medial condyle. 2. Subchondral edema with moderately severe cartilage fraying of the posterior  lateral condyle. 3. Patellar cartilage not well evaluated due to artifacts. Suspect focal  calcification along the medial facet with cartilage fraying and subchondral  edema. Mild edema at the lateral patellar retinaculum. 4. Small joint effusion and small popliteal cyst.    Hypertension  Patient is currently taking   Key CAD CHF Meds             amLODIPine (NORVASC) 5 mg tablet Take 1 Tab by mouth daily. hydroCHLOROthiazide (HYDRODIURIL) 12.5 mg tablet Take 1 Tab by mouth daily. . BP today is   BP Readings from Last 1 Encounters:   10/09/19 126/78     Patient has been taking medications as prescribed. Patient is not checking BP at home. Patient does follow low salt diet. Patient is not currently exercising. Patient denies chest pain, shortness of breath, palpitations, lower extremity edema, dizziness/lightheadedness or syncopal episodes. ALLERGIES:   Allergies   Allergen Reactions    Citric Acid Rash    Cristina Hives    Red Dye Hives    Strawberry Hives        ACTIVE MEDICAL PROBLEMS:  Patient Active Problem List   Diagnosis Code    Morbid obesity ([facility] [address] E66.01    Hypertension I10    Family history of diabetes mellitus (DM) Z83.3    Prediabetes R73.03    Mixed hyperlipidemia E78.2    Allergic rhinitis J30.9    Vitamin D deficiency E55.9    Severe obesity (BMI 35.0-35.9 with comorbidity) ([facility]) E66.01, Z68.35    Injury of meniscus of right knee S83.8X1A    Right knee pain M25.561    Elevated LDL cholesterol level E78.00        MEDICATIONS:   Current Outpatient Medications   Medication Sig Dispense Refill    amLODIPine (NORVASC) 5 mg tablet Take 1 Tab by mouth daily. 90 Tab 3    loratadine (CLARITIN) 10 mg tablet Take 10 mg by mouth.       hydroCHLOROthiazide (HYDRODIURIL) 12.5 mg tablet Take 1 Tab by mouth daily. 90 Tab 3        Review of Systems   Constitutional: Negative for chills, fever, malaise/fatigue and weight loss. Eyes: Negative for blurred vision, double vision and pain. Respiratory: Negative for cough, sputum production, shortness of breath and wheezing. Cardiovascular: Negative for chest pain, palpitations, orthopnea, claudication and leg swelling. Gastrointestinal: Negative for abdominal pain, constipation, diarrhea, nausea and vomiting. Genitourinary: Negative for dysuria, frequency and urgency. Musculoskeletal: Positive for joint pain (right knee pain). Negative for back pain, falls, myalgias and neck pain. Neurological: Negative for dizziness, tingling and headaches. PHYSICAL EXAMINATION:  Visit Vitals  BP (!) 148/94   Pulse 93   Temp 97 °F (36.1 °C) (Oral)   Resp 18   Ht 5' 1\" (1.549 m)   Wt 212 lb (96.2 kg)   SpO2 99%   BMI 40.06 kg/m²      Body mass index is 40.06 kg/m². Physical Exam   Constitutional: She is oriented to person, place, and time and well-developed, well-nourished, and in no distress. No distress. Neck: Normal range of motion. Neck supple. No thyromegaly present. Cardiovascular: Normal rate, regular rhythm, normal heart sounds and intact distal pulses. Exam reveals no gallop and no friction rub. No murmur heard. Pulmonary/Chest: Effort normal and breath sounds normal. No respiratory distress. She has no wheezes. She has no rales. She exhibits no tenderness. Lymphadenopathy:     She has no cervical adenopathy. Neurological: She is alert and oriented to person, place, and time. Skin: Skin is warm and dry. She is not diaphoretic. Psychiatric: Mood, memory, affect and judgment normal.   Vitals reviewed. ASSESSMENT/PLAN:  Diagnoses and all orders for this visit:    1.  Acute medial meniscus tear of right knee, initial encounter  -     REFERRAL TO ORTHOPEDICS  As with patient at length that I am recommending that she see an orthopedic surgeon for management. Patient is amenable. We will put in referral today. 2. Essential hypertension  Continue with   Key CAD CHF Meds             amLODIPine (NORVASC) 5 mg tablet Take 1 Tab by mouth daily. hydroCHLOROthiazide (HYDRODIURIL) 12.5 mg tablet Take 1 Tab by mouth daily. 1) Goal blood pressure less than equal to 140/90 mmHg, goal BP can vary depending on risk factors as discussed. 2) Lifestyle modifications discussed with patient, low sodium <2 gm, low salt , DASH diet  3) Exercise for at least 30 min 3-5 times a week for goal BMI of less than or equal  To 25.  4) Continue current medications as prescribed. 5) Please begin medication as discussed for better blood pressure control, explained side effects and patient verbalized understanding. 6) Goal LDL<100.  7) Please monitor your blood pressure and keep a log to bring in with you at each visit. 8) Discussed risk factors with patient such as CAD, FAST of stroke symptoms, pt verbalizes understanding. 9) Please avoid smoking , alcohol and any illicit drug use if applicable to you. 10) Discussed lifestyle modifications ,dietary control and BP monitoring at home as patient does not wish to begin an antihypertension agent at present. Patient advised to return to clinic if symptoms persist or to ED if they become worse  Patient verbalized understanding to above instructions.   More than 50% of this 15 min visit was spent counseling the patient face to face about etiology and treatment of health conditions outlined in assessment and plan           Levy Duncan PA-C   10/9/2019   8:33 AM

## 2019-11-07 ENCOUNTER — OFFICE VISIT (OUTPATIENT)
Dept: ORTHOPEDIC SURGERY | Age: 54
End: 2019-11-07

## 2019-11-07 VITALS
TEMPERATURE: 97.7 F | BODY MASS INDEX: 40.25 KG/M2 | WEIGHT: 213.2 LBS | OXYGEN SATURATION: 97 % | HEART RATE: 100 BPM | SYSTOLIC BLOOD PRESSURE: 127 MMHG | DIASTOLIC BLOOD PRESSURE: 89 MMHG | HEIGHT: 61 IN | RESPIRATION RATE: 16 BRPM

## 2019-11-07 DIAGNOSIS — S83.241A TEAR OF MEDIAL MENISCUS OF RIGHT KNEE, CURRENT, UNSPECIFIED TEAR TYPE, INITIAL ENCOUNTER: Primary | ICD-10-CM

## 2019-11-07 DIAGNOSIS — M17.11 PRIMARY OSTEOARTHRITIS OF RIGHT KNEE: ICD-10-CM

## 2019-11-07 NOTE — PATIENT INSTRUCTIONS
Meniscus Tear: Exercises  Introduction  Here are some examples of exercises for you to try. The exercises may be suggested for a condition or for rehabilitation. Start each exercise slowly. Ease off the exercises if you start to have pain. You will be told when to start these exercises and which ones will work best for you. How to do the exercises  Calf wall stretch    1. Stand facing a wall with your hands on the wall at about eye level. Put your affected leg about a step behind your other leg. 2. Keeping your back leg straight and your back heel on the floor, bend your front knee and gently bring your hip and chest toward the wall until you feel a stretch in the calf of your back leg. 3. Hold the stretch for at least 15 to 30 seconds. 4. Repeat 2 to 4 times. 5. Repeat steps 1 through 4, but this time keep your back knee bent. Hamstring wall stretch    1. Lie on your back in a doorway, with your good leg through the open door. 2. Slide your affected leg up the wall to straighten your knee. You should feel a gentle stretch down the back of your leg. 1. Do not arch your back. 2. Do not bend either knee. 3. Keep one heel touching the floor and the other heel touching the wall. Do not point your toes. 3. Hold the stretch for at least 1 minute. Then over time, try to lengthen the time you hold the stretch to as long as 6 minutes. 4. Repeat 2 to 4 times. 5. If you do not have a place to do this exercise in a doorway, there is another way to do it:  6. Lie on your back, and bend your affected leg. 7. Loop a towel under the ball and toes of that foot, and hold the ends of the towel in your hands. 8. Straighten your knee, and slowly pull back on the towel. You should feel a gentle stretch down the back of your leg. 9. Hold the stretch for at least 15 to 30 seconds. Or even better, hold the stretch for 1 minute if you can. 10. Repeat 2 to 4 times. Quad sets    1.  Sit with your leg straight and supported on the floor or a firm bed. (If you feel discomfort in the front or back of your knee, place a small towel roll under your knee.)  2. Tighten the muscles on top of your thigh by pressing the back of your knee flat down to the floor. (If you feel discomfort under your kneecap, place a small towel roll under your knee.)  3. Hold for about 6 seconds, then rest up to 10 seconds. 4. Do 8 to 12 repetitions several times a day. Straight-leg raises to the front    1. Lie on your back with your good knee bent so that your foot rests flat on the floor. Your injured leg should be straight. Make sure that your low back has a normal curve. You should be able to slip your flat hand in between the floor and the small of your back, with your palm touching the floor and your back touching the back of your hand. 2. Tighten the thigh muscles in the injured leg by pressing the back of your knee flat down to the floor. Hold your knee straight. 3. Keeping the thigh muscles tight, lift your injured leg up so that your heel is about 12 inches off the floor. Hold for 5 seconds and then lower slowly. 4. Do 8 to 12 repetitions. Straight-leg raises to the back    1. Lie on your stomach, and lift your leg straight up behind you (toward the ceiling). 2. Lift your toes about 6 inches off the floor, hold for about 6 seconds, then lower slowly. 3. Do 8 to 12 repetitions. Hamstring curls    1. Lie on your stomach with your knees straight. If your kneecap is uncomfortable, roll up a washcloth and put it under your leg just above your kneecap. 2. Lift the foot of your injured leg by bending the knee so that you bring the foot up toward your buttock. If this motion hurts, try it without bending your knee quite as far. This may help you avoid any painful motion. 3. Slowly lower your leg back to the floor. 4. Do 8 to 12 repetitions.   5. With permission from your doctor or physical therapist, you may also want to add a cuff weight to your ankle (not more than 5 pounds). With weight, you do not have to lift your leg more than 12 inches to get a hamstring workout. Wall slide with ball squeeze    1. Stand with your back against a wall and with your feet about shoulder-width apart. Your feet should be about 12 inches away from the wall. 2. Put a ball about the size of a soccer ball between your knees. Then slowly slide down the wall until your knees are bent about 20 to 30 degrees. 3. Tighten your thigh muscles by squeezing the ball between your knees. Hold that position for about 10 seconds, then stop squeezing. Rest for up to 10 seconds between repetitions. 4. Repeat 8 to 12 times. Heel raises    1. Stand with your feet a few inches apart, with your hands lightly resting on a counter or chair in front of you. 2. Slowly raise your heels off the floor while keeping your knees straight. 3. Hold for about 6 seconds, then slowly lower your heels to the floor. 4. Do 8 to 12 repetitions several times during the day. Heel dig bridging    1. Lie on your back with both knees bent and your ankles bent so that only your heels are digging into the floor. Your knees should be bent about 90 degrees. 2. Then push your heels into the floor, squeeze your buttocks, and lift your hips off the floor until your shoulders, hips, and knees are all in a straight line. 3. Hold for about 6 seconds as you continue to breathe normally, and then slowly lower your hips back down to the floor and rest for up to 10 seconds. 4. Do 8 to 12 repetitions. Shallow standing knee bends    1. Stand with your hands lightly resting on a counter or chair in front of you. Put your feet shoulder-width apart. 2. Slowly bend your knees so that you squat down like you are going to sit in a chair. Make sure your knees do not go in front of your toes. 3. Lower yourself about 6 inches. Your heels should remain on the floor at all times.   4. Rise slowly to a standing position. Follow-up care is a key part of your treatment and safety. Be sure to make and go to all appointments, and call your doctor if you are having problems. It's also a good idea to know your test results and keep a list of the medicines you take. Where can you learn more? Go to http://mahesh-mindy.info/. Enter C183 in the search box to learn more about \"Meniscus Tear: Exercises. \"  Current as of: June 26, 2019  Content Version: 12.2  © 5719-2139 [employer]. Care instructions adapted under license by [employer] (which disclaims liability or warranty for this information). If you have questions about a medical condition or this instruction, always ask your healthcare professional. [address] any warranty or liability for your use of this information. Meniscus Tear: Care Instructions  Your Care Instructions    The meniscus is rubbery tissue in the knee that acts as a shock absorber between the upper and lower leg bones. The meniscus also keeps your knee stable by spreading weight across it. Each knee has two menisci (plural of meniscus). You can tear a meniscus if you plant your foot and twist, or pivot. The meniscus also can wear down as you age, and it can tear from squatting or kneeling. Small tears may heal on their own with rest and some physical therapy. But a more serious tear may need surgery to repair it or to remove part of the meniscus. Your doctor may want you to see a doctor who specializes in bones and sports injuries. Follow-up care is a key part of your treatment and safety. Be sure to make and go to all appointments, and call your doctor if you are having problems. It's also a good idea to know your test results and keep a list of the medicines you take. How can you care for yourself at home? · Rest your knee when possible. · Do not squat or kneel. · Take pain medicines exactly as directed.   ? If the doctor gave you a prescription medicine for pain, take it as prescribed. ? If you are not taking a prescription pain medicine, ask your doctor if you can take an over-the-counter medicine. · Put ice or a cold pack on your knee for 10 to 20 minutes at a time. Try to do this every 1 to 2 hours for the next 3 days (when you are awake) or until the swelling goes down. Put a thin cloth between the ice and your skin. · Prop up the sore leg on a pillow when you ice your knee or any time you sit or lie down during the next 3 days. Try to keep your leg above the level of your heart. This will help reduce swelling. · Follow your doctor's directions for using crutches or a knee brace, if suggested. · Follow your doctor's directions for exercises to keep your knee mobile and your leg muscles strong. Here are a few exercises you can try if your doctor says it is okay. ? Quad sets: Lie down on the floor or the bed with your injured leg straight. Fully extend your leg--there should be no or little bend in your knee. Tighten the thigh (quadriceps) of your injured leg for 6 seconds. Do not lift your heel up. Relax your quadriceps for 10 seconds. Repeat this exercise 8 to 12 times several times during the day. ? Straight-leg raises: Lie down on the floor or the bed with your injured leg flat and your uninjured leg bent so that the bottom of your foot is on the floor or bed. Tighten the quadriceps of your injured leg. Keeping your knee as straight as possible, lift your injured leg off the bed until it is about 18 inches above the bed or floor. Lower your leg back down and relax for 5 seconds. Do 3 sets of 20 repetitions, or if you tire quickly, 3 sets of 8 to 12 repetitions. ? Heel raises: Stand with your feet a few inches apart. Rest your hands lightly on a counter or chair in front of you. Slowly raise your heels off the floor while keeping your knees straight. Hold for 3 seconds, then slowly lower your heels to the floor.  Do 3 sets of 8 to 12 repetitions. ? Heel slides: Lie down on the floor or the bed with your leg flat. Slowly begin to slide your heel toward your rear end (buttocks), keeping your heel on the floor. Your knee will begin to bend. Slide your heel and bend your knee until it becomes a little sore and you can feel a small amount of pressure inside your knee. Hold this position for 10 seconds. Slide your heel back down until your leg is straight on the floor. Relax for 10 seconds. Repeat this exercise 20 times. When should you call for help? Watch closely for changes in your health, and be sure to contact your doctor if:    · You have increasing knee pain or swelling or both.     · Your knee is so sore or stiff that you cannot walk on it.     · You do not get better as expected. Where can you learn more? Go to http://mahesh-mindy.info/. Enter A870 in the search box to learn more about \"Meniscus Tear: Care Instructions. \"  Current as of: June 26, 2019  Content Version: 12.2  © 5023-8103 [employer]. Care instructions adapted under license by [employer] (which disclaims liability or warranty for this information). If you have questions about a medical condition or this instruction, always ask your healthcare professional. [address] any warranty or liability for your use of this information. Dr. Amrik Nuñez Knee Arthroscopy Surgery    What is the surgery? - This is an outpatient procedure at either [place] [address] or [address] [place] [address] Rd will be completely asleep for procedure. Dr. Amrik Nuñez will make 2 small incisions in your knee. He will take a tour of your knee with the camera and then address the meniscal tear(s). We will be able to evaluate if any arthritis in your knee but this surgery is not to treat your arthritis. - Total surgery takes about 25-30 mins     What can you expect after surgery?   - You will have a bulky dressing on your knee that you can remove 2 days after surgery. You will be able to shower 2 days after surgery but no soaking in a bath, hot tub, ocean or pool x 2 weeks to allow for full wound healing. No special brace is needed. - You will be on crutches or a walker when you leave the hospital. You can place weight on your leg as tolerated starting immediately. You are usually on crutches or your walker for about 4-5 days.   - Even though you can place weight on your leg, we recommend no walking or standing longer than 10mins for the first week. We will gradually increase your activities after that point.    - Dr. Cathryn Styles will start physical therapy for you when you return for your 1 week post op apt  - It will take your 6-8 weeks to fully recover from your surgery. When can I return to work? - Most patients return to desk work only after 1-2 weeks. We recommend no prolonged walking or standing, climbing, kneeling, or crawling x 6-8 weeks. Not all knee arthroscopies are the same. The specifics of your individual case will be discussed at length with you by Dr. Cathryn Styles and his Physician Assistant. Angie Barber  Surgical Coordinator  [address] [place] [address]. [facility].  [address], [address]  [email or web address]  P: [phone]  F: [phone]

## 2019-11-07 NOTE — PROGRESS NOTES
1. Have you been to the ER, urgent care clinic since your last visit? Hospitalized since your last visit? No    2. Have you seen or consulted any other health care providers outside of the [address] [place] [address] since your last visit? Include any pap smears or colon screening.  No

## 2019-11-07 NOTE — PROGRESS NOTES
Darwin Root  1965   Chief Complaint   Patient presents with    Knee Pain     right knee pain        HISTORY OF PRESENT ILLNESS  Darwin Root is a 49 y.o. female who presents today for evaluation of right knee pain. Pt referred by NAOMY Ro. She rates her pain 0/10 today. Pain has been present since April after moving a mattress. She notes she twisted her knee and felt a pop while she was moving the mattress. Pain is intermittent. She notes intermittent swelling. Patient describes the pain as aching that is Intermittent in nature. Symptoms are worse with prolonged walking and standing, Activity and is better with  Rest. Associated symptoms include nothing. Since problem started, it: is unchanged. Pain does not wake patient up at night. Has taken no meds for the problem. Has tried following treatments: Injections:NO; Brace:NO;  Therapy:NO; Cane/Crutch:NO       Allergies   Allergen Reactions    Citric Acid Rash    Cristina Hives    Red Dye Hives    Strawberry Hives        Past Medical History:   Diagnosis Date    Hypertension 2017    Menopause     Morbid obesity ([facility] [address]       Social History     Socioeconomic History    Marital status:      Spouse name: Not on file    Number of children: 1    Years of education: 13+    Highest education level: Not on file   Occupational History    Occupation:    Social Needs    Financial resource strain: Not on file    Food insecurity:     Worry: Not on file     Inability: Not on file    Transportation needs:     Medical: Not on file     Non-medical: Not on file   Tobacco Use    Smoking status: Never Smoker    Smokeless tobacco: Never Used   Substance and Sexual Activity    Alcohol use: No    Drug use: No    Sexual activity: Not Currently     Partners: Male   Lifestyle    Physical activity:     Days per week: Not on file     Minutes per session: Not on file    Stress: Not on file   Relationships    Social connections:     Talks on phone: Not on file     Gets together: Not on file     Attends Jain service: Not on file     Active member of club or organization: Not on file     Attends meetings of clubs or organizations: Not on file     Relationship status: Not on file    Intimate partner violence:     Fear of current or ex partner: Not on file     Emotionally abused: Not on file     Physically abused: Not on file     Forced sexual activity: Not on file   Other Topics Concern     Service No    Blood Transfusions No    Caffeine Concern Not Asked    Occupational Exposure Not Asked   [employer] Hazards Not Asked    Sleep Concern Not Asked    Stress Concern Not Asked    Weight Concern Not Asked    Special Diet Not Asked    Back Care Not Asked    Exercise Not Asked    Bike Helmet Not Asked   [address] Not Asked    Self-Exams Not Asked   Social History Narrative    Living in [place], [place]      Past Surgical History:   Procedure Laterality Date    HX DILATION AND CURETTAGE      HX TUBAL LIGATION      btl      Family History   Problem Relation Age of Onset    Breast Cancer Maternal Grandmother 46    Breast Cancer Mother 76    Hypertension Mother     Diabetes Mother     Dementia Mother 71    Hypertension Father     Diabetes Father     Kidney Disease Father         on HD    Colon Cancer Neg Hx     Ovarian Cancer Neg Hx       Current Outpatient Medications   Medication Sig    amLODIPine (NORVASC) 5 mg tablet Take 1 Tab by mouth daily.  loratadine (CLARITIN) 10 mg tablet Take 10 mg by mouth.  hydroCHLOROthiazide (HYDRODIURIL) 12.5 mg tablet Take 1 Tab by mouth daily. No current facility-administered medications for this visit. REVIEW OF SYSTEM   Patient denies: Weight loss, Fever/Chills, HA, Visual changes, Fatigue, Chest pain, SOB, Abdominal pain, N/V/D/C, Blood in stool or urine, Edema. Pertinent positive as above in HPI.  All others were negative    PHYSICAL EXAM:   Visit Vitals  /89 Pulse 100   Temp 97.7 °F (36.5 °C) (Oral)   Resp 16   Ht 5' 1\" (1.549 m)   Wt 213 lb 3.2 oz (96.7 kg)   SpO2 97%   BMI 40.28 kg/m²     The patient is a well-developed, well-nourished female   in no acute distress. The patient is alert and oriented times three. The patient is alert and oriented times three. Mood and affect are normal.  LYMPHATIC: lymph nodes are not enlarged and are within normal limits  SKIN: normal in color and non tender to palpation. There are no bruises or abrasions noted. NEUROLOGICAL: Motor sensory exam is within normal limits. Reflexes are equal bilaterally. There is normal sensation to pinprick and light touch  MUSCULOSKELETAL:  Examination Right knee   Skin Intact   Range of motion 0-130   Effusion +   Medial joint line tenderness +   ` -   Tenderness Pes Bursa -   Tenderness insertion MCL -   Tenderness insertion LCL -   Gils +   Patella crepitus -   Patella grind -   Lachman -   Pivot shift -   Anterior drawer -   Posterior drawer -   Varus stress -   Valgus stress -   Neurovascular Intact   Calf Swelling and Tenderness to Palpation -   Ambar's Test -   Hamstring Cord Tightness -       IMAGING: MRI of right knee dated 7/11/19 was reviewed and read by Dr. Tristan Gaona:   IMPRESSION:  1. Large undersurface flap tear in most of the posterior horn medial meniscus. Fraying of the inner edge in the meniscal body. Cartilage fraying of the lateral  aspect of the posterior medial condyle. 2. Subchondral edema with moderately severe cartilage fraying of the posterior  lateral condyle. 3. Patellar cartilage not well evaluated due to artifacts. Suspect focal  calcification along the medial facet with cartilage fraying and subchondral  edema. Mild edema at the lateral patellar retinaculum. 4. Small joint effusion and small popliteal cyst.    XR of right knee dated 5/20/19 was reviewed and read by Dr. Tristan Gaona: Normal examination         IMPRESSION:      ICD-10-CM ICD-9-CM    1.  Tear of medial meniscus of right knee, current, unspecified tear type, initial encounter S83.241A 836.0    2. Primary osteoarthritis of right knee M17.11 715.16         PLAN:  1. I discussed the risks and benefits and potential adverse outcomes of both operative vs non operative treatment of right medial meniscus tear with the patient. Patient wishes to proceed with arthroscopic right partial medial menisectomy. Risks of operative intervention include but not limited to bleeding, infection, deep vein thrombosis, pulmonary embolism, death, limb length discrepancy, reflexive sympathetic dystrophy, fat embolism syndrome,damage to blood vessels and nerves, malunion, non-union, delayed union, failure of hardware, post traumatic arthritis, stroke, heart attack, and death. Patient understands that infection may arise and may require numerous surgeries. History and physical exam scheduled for a later date. Risk factors include: htn, BMI>35, primary OA  2. No ultrasound exam indicated today  3. No cortisone injection indicated today   4. No Physical/Occupational Therapy indicated today  5. No diagnostic test indicated today:   6. No durable medical equipment indicated today  7. No referral indicated today   8. No medications indicated today:   9. No Narcotic indicated today        RTC H&P    Office note will be sent to referring provider. Scribed by Susan Lakhani Smoker) as dictated by Janet Low MD    I, Dr. Janet Low, confirm that all documentation is accurate.     Janet Low M.D.   [facility]

## 2020-08-10 ENCOUNTER — OFFICE VISIT (OUTPATIENT)
Dept: FAMILY MEDICINE CLINIC | Age: 55
End: 2020-08-10

## 2020-08-10 VITALS
HEIGHT: 61 IN | HEART RATE: 99 BPM | OXYGEN SATURATION: 97 % | WEIGHT: 213.2 LBS | SYSTOLIC BLOOD PRESSURE: 128 MMHG | TEMPERATURE: 97.2 F | DIASTOLIC BLOOD PRESSURE: 83 MMHG | BODY MASS INDEX: 40.25 KG/M2 | RESPIRATION RATE: 16 BRPM

## 2020-08-10 DIAGNOSIS — R73.03 PREDIABETES: ICD-10-CM

## 2020-08-10 DIAGNOSIS — I10 ESSENTIAL HYPERTENSION: ICD-10-CM

## 2020-08-10 DIAGNOSIS — Z00.00 WELL WOMAN EXAM (NO GYNECOLOGICAL EXAM): Primary | ICD-10-CM

## 2020-08-10 DIAGNOSIS — E55.9 VITAMIN D DEFICIENCY: ICD-10-CM

## 2020-08-10 RX ORDER — HYDROCHLOROTHIAZIDE 12.5 MG/1
CAPSULE ORAL
COMMUNITY
Start: 2020-07-14 | End: 2020-08-10 | Stop reason: SDUPTHER

## 2020-08-10 RX ORDER — AMLODIPINE BESYLATE 5 MG/1
TABLET ORAL
COMMUNITY
Start: 2020-08-07 | End: 2021-01-22

## 2020-08-10 RX ORDER — HYDROCHLOROTHIAZIDE 12.5 MG/1
12.5 CAPSULE ORAL DAILY
Qty: 90 CAP | Refills: 3 | Status: SHIPPED | OUTPATIENT
Start: 2020-08-10 | End: 2021-08-08

## 2020-08-10 RX ORDER — FLUTICASONE PROPIONATE 50 MCG
1 SPRAY, SUSPENSION (ML) NASAL DAILY
COMMUNITY
Start: 2020-01-25 | End: 2020-08-10 | Stop reason: ALTCHOICE

## 2020-08-10 NOTE — PATIENT INSTRUCTIONS
Well Visit, Women 48 to 72: Care Instructions Your Care Instructions Physical exams can help you stay healthy. Your doctor has checked your overall health and may have suggested ways to take good care of yourself. He or she also may have recommended tests. At home, you can help prevent illness with healthy eating, regular exercise, and other steps. Follow-up care is a key part of your treatment and safety. Be sure to make and go to all appointments, and call your doctor if you are having problems. It's also a good idea to know your test results and keep a list of the medicines you take. How can you care for yourself at home? · Reach and stay at a healthy weight. This will lower your risk for many problems, such as obesity, diabetes, heart disease, and high blood pressure. · Get at least 30 minutes of exercise on most days of the week. Walking is a good choice. You also may want to do other activities, such as running, swimming, cycling, or playing tennis or team sports. · Do not smoke. Smoking can make health problems worse. If you need help quitting, talk to your doctor about stop-smoking programs and medicines. These can increase your chances of quitting for good. · Protect your skin from too much sun. When you're outdoors from 10 a.m. to 4 p.m., stay in the shade or cover up with clothing and a hat with a wide brim. Wear sunglasses that block UV rays. Even when it's cloudy, put broad-spectrum sunscreen (SPF 30 or higher) on any exposed skin. · See a dentist one or two times a year for checkups and to have your teeth cleaned. · Wear a seat belt in the car. Follow your doctor's advice about when to have certain tests. These tests can spot problems early. · Cholesterol. Your doctor will tell you how often to have this done based on your age, family history, or other things that can increase your risk for heart attack and stroke. · Blood pressure. Have your blood pressure checked during a routine doctor visit. Your doctor will tell you how often to check your blood pressure based on your age, your blood pressure results, and other factors. · Mammogram. Ask your doctor how often you should have a mammogram, which is an X-ray of your breasts. A mammogram can spot breast cancer before it can be felt and when it is easiest to treat. · Pap test and pelvic exam. Ask your doctor how often you should have a Pap test. You may not need to have a Pap test as often as you used to. · Vision. Have your eyes checked every year or two or as often as your doctor suggests. Some experts recommend that you have yearly exams for glaucoma and other age-related eye problems starting at age 48. · Hearing. Tell your doctor if you notice any change in your hearing. You can have tests to find out how well you hear. · Diabetes. Ask your doctor whether you should have tests for diabetes. · Colorectal cancer. Your risk for colorectal cancer gets higher as you get older. Some experts say that adults should start regular screening at age 48 and stop at age 76. Others say to start before age 48 or continue after age 76. Talk with your doctor about your risk and when to start and stop screening. · Thyroid disease. Talk to your doctor about whether to have your thyroid checked as part of a regular physical exam. Women have an increased chance of a thyroid problem. · Osteoporosis. You should begin tests for bone density at age 72. If you are younger than 72, ask your doctor whether you have factors that may increase your risk for this disease. You may want to have this test before age 72. · Heart attack and stroke risk. At least every 4 to 6 years, you should have your risk for heart attack and stroke assessed.  Your doctor uses factors such as your age, blood pressure, cholesterol, and whether you smoke or have diabetes to show what your risk for a heart attack or stroke is over the next 10 years. When should you call for help? Watch closely for changes in your health, and be sure to contact your doctor if you have any problems or symptoms that concern you. Where can you learn more? Go to http://mahesh-mindy.info/ Enter G225 in the search box to learn more about \"Well Visit, Women 50 to 72: Care Instructions. \" Current as of: August 22, 2019               Content Version: 12.5 © 1419-0142 [employer], [employer]orporated. Care instructions adapted under license by [employer] (which disclaims liability or warranty for this information). If you have questions about a medical condition or this instruction, always ask your healthcare professional. [address] any warranty or liability for your use of this information.

## 2020-08-10 NOTE — PROGRESS NOTES
Pt is here for establish care , hypertension      1. Have you been to the ER, urgent care clinic since your last visit? Hospitalized since your last visit? No    2. Have you seen or consulted any other health care providers outside of the [address] [place] [address] since your last visit? Include any pap smears or colon screening.  No

## 2020-08-10 NOTE — PROGRESS NOTES
Subjective:   48 y.o. female for Well Woman Check. She is postmenopausal.  Social History: has sex with males. Pertinent past medical hstory: as below. Pt is new to the practice. She feels well; She has a h/o HTN, Vitamin D deficiency and Prediabetes. She is in need of follow up on labs. She feels well; She has no new physical complaints she needs to have addressed. She wants to lose weight. She does walk despite some knee problems she has at times. She tries to eat a healthy diet. She may have some dietary indiscretions at times. Patient Active Problem List    Diagnosis Date Noted    Current tear of meniscus     H/O seasonal allergies     Menopause     Osteoarthritis     Elevated LDL cholesterol level 05/22/2019    Severe obesity (BMI 35.0-35.9 with comorbidity) ([facility] [address] 05/20/2019    Injury of meniscus of right knee 05/20/2019    Right knee pain 05/20/2019    Prediabetes 05/22/2018    Mixed hyperlipidemia 05/22/2018    Family history of diabetes mellitus (DM) 04/20/2018    Morbid obesity ([facility] [address]     Hypertension 01/01/2017    Vitamin D deficiency 09/01/2010    Allergic rhinitis 08/19/2010     Current Outpatient Medications   Medication Sig Dispense Refill    amLODIPine (NORVASC) 5 mg tablet       hydroCHLOROthiazide (MICROZIDE) 12.5 mg capsule Take 1 Cap by mouth daily. 90 Cap 3    loratadine (CLARITIN) 10 mg tablet Take 10 mg by mouth.        Allergies   Allergen Reactions    Citric Acid Rash    Cristina Hives    Red Dye Hives    Strawberry Hives     Past Medical History:   Diagnosis Date    Current tear of meniscus     R knee    H/O seasonal allergies     Hypertension 2017    Menopause     Morbid obesity (HCC)     Osteoarthritis     R knee     Past Surgical History:   Procedure Laterality Date    HX DILATION AND CURETTAGE      HX TUBAL LIGATION      btl     Family History   Problem Relation Age of Onset    Breast Cancer Maternal Grandmother 46    Breast Cancer Mother 76    Hypertension Mother     Diabetes Mother     Dementia Mother 71    Heart Failure Mother     Hypertension Father     Diabetes Father     Kidney Disease Father         on HD    Heart Failure Father     Colon Cancer Neg Hx     Ovarian Cancer Neg Hx      Social History     Tobacco Use    Smoking status: Never Smoker    Smokeless tobacco: Never Used   Substance Use Topics    Alcohol use: No        ROS:  Feeling well. No dyspnea or chest pain on exertion. No abdominal pain, change in bowel habits, black or bloody stools. No urinary tract symptoms. GYN ROS: no breast pain or new or enlarging lumps on self exam, no vaginal bleeding, no discharge or pelvic pain. Menopausal symptoms: none. No neurological complaints. Objective:     Visit Vitals  /83 (BP 1 Location: Left arm)   Pulse 99   Temp 97.2 °F (36.2 °C) (Temporal)   Resp 16   Ht 5' 1\" (1.549 m)   Wt 213 lb 3.2 oz (96.7 kg)   SpO2 97%   BMI 40.28 kg/m²     The patient appears well, alert, oriented x 3, in no distress. ENT normal.  Neck supple. No adenopathy or thyromegaly. DANDY. Lungs are clear, good air entry, no wheezes, rhonchi or rales. S1 and S2 normal, no murmurs, regular rate and rhythm. Abdomen soft without tenderness, guarding, mass or organomegaly. Extremities show no edema, normal peripheral pulses. Neurological is normal, no focal findings. Assessment/Plan:   well woman  postmenopausal  return annually or prn    ICD-10-CM ICD-9-CM    1. Well woman exam (no gynecological exam)  Z00.00 V70.0    2. Essential hypertension - for lab I10 401.9 LIPID PANEL      METABOLIC PANEL, BASIC   3. Vitamin D deficiency - for lab E55.9 268.9 VITAMIN D, 25 HYDROXY   4. Prediabetes - for lab R73.03 790.29 HEMOGLOBIN A1C WITH EAG   .     As above,   above all stable unless otherwise noted   treatment plan as listed below  Orders Placed This Encounter    LIPID PANEL    METABOLIC PANEL, BASIC    VITAMIN D, 25 HYDROXY    HEMOGLOBIN A1C WITH EAG    amLODIPine (NORVASC) 5 mg tablet    DISCONTD: fluticasone propionate (FLONASE) 50 mcg/actuation nasal spray    DISCONTD: hydroCHLOROthiazide (MICROZIDE) 12.5 mg capsule    hydroCHLOROthiazide (MICROZIDE) 12.5 mg capsule     Refilled microzide per pt request  Follow-up and Dispositions    · Return in about 6 months (around 2/10/2021) for htn. An After Visit Summary was printed and given to the patient. This has been fully explained to the patient, who indicates understanding.

## 2020-08-19 LAB
25(OH)D3+25(OH)D2 SERPL-MCNC: 13.9 NG/ML (ref 30–100)
BUN SERPL-MCNC: 11 MG/DL (ref 6–24)
BUN/CREAT SERPL: 12 (ref 9–23)
CALCIUM SERPL-MCNC: 9.3 MG/DL (ref 8.7–10.2)
CHLORIDE SERPL-SCNC: 104 MMOL/L (ref 96–106)
CHOLEST SERPL-MCNC: 190 MG/DL (ref 100–199)
CO2 SERPL-SCNC: 24 MMOL/L (ref 20–29)
CREAT SERPL-MCNC: 0.93 MG/DL (ref 0.57–1)
EST. AVERAGE GLUCOSE BLD GHB EST-MCNC: 128 MG/DL
GLUCOSE SERPL-MCNC: 101 MG/DL (ref 65–99)
HBA1C MFR BLD: 6.1 % (ref 4.8–5.6)
HDLC SERPL-MCNC: 44 MG/DL
LDLC SERPL CALC-MCNC: 131 MG/DL (ref 0–99)
POTASSIUM SERPL-SCNC: 3.6 MMOL/L (ref 3.5–5.2)
SODIUM SERPL-SCNC: 142 MMOL/L (ref 134–144)
TRIGL SERPL-MCNC: 75 MG/DL (ref 0–149)
VLDLC SERPL CALC-MCNC: 15 MG/DL (ref 5–40)

## 2020-10-26 ENCOUNTER — HOSPITAL ENCOUNTER (OUTPATIENT)
Dept: MAMMOGRAPHY | Age: 55
Discharge: HOME | End: 2020-10-26
Attending: INTERNAL MEDICINE
Payer: COMMERCIAL

## 2020-10-26 DIAGNOSIS — Z12.31 VISIT FOR SCREENING MAMMOGRAM: ICD-10-CM

## 2020-10-26 PROCEDURE — 77063 BREAST TOMOSYNTHESIS BI: CPT

## 2021-02-23 ENCOUNTER — VIRTUAL VISIT (OUTPATIENT)
Dept: FAMILY MEDICINE CLINIC | Age: 56
End: 2021-02-23
Payer: COMMERCIAL

## 2021-02-23 DIAGNOSIS — R73.03 PREDIABETES: ICD-10-CM

## 2021-02-23 DIAGNOSIS — E78.2 MIXED HYPERLIPIDEMIA: ICD-10-CM

## 2021-02-23 DIAGNOSIS — I10 ESSENTIAL HYPERTENSION: Primary | ICD-10-CM

## 2021-02-23 DIAGNOSIS — E66.01 SEVERE OBESITY (BMI 35.0-35.9 WITH COMORBIDITY) (HCC): ICD-10-CM

## 2021-02-23 DIAGNOSIS — E55.9 VITAMIN D DEFICIENCY: ICD-10-CM

## 2021-02-23 PROCEDURE — 99214 OFFICE O/P EST MOD 30 MIN: CPT | Performed by: FAMILY MEDICINE

## 2021-02-23 NOTE — PROGRESS NOTES
Zelalem Camargo is a 48 y.o. female who was seen by synchronous (real-time) audio-video technology on 2/23/2021 for Hypertension        Assessment & Plan:   Diagnoses and all orders for this visit:    1. Essential hypertension  -     METABOLIC PANEL, COMPREHENSIVE; Future    2. Vitamin D deficiency  -     VITAMIN D, 25 HYDROXY; Future    3. Prediabetes  -     HEMOGLOBIN A1C WITH EAG; Future    4. Mixed hyperlipidemia  -     LIPID PANEL; Future    5. Severe obesity (BMI 35.0-35.9 with comorbidity) ([facility] [address]      As above,   Advised pt to log BPs  Labs as ordered  Follow-up and Dispositions    · Return in about 3 months (around 5/23/2021) for htn. [facility] and pt has been advised of same. 712  Subjective:     Pt has HTN;  She has not been taking her BPs; she is on meds as listed below; tolerates meds well. She tries to adhere to a lower sodium diet. NO refills are needed. Was feeling \" groggy\" a few weeks ago; Thinks this was due to not taking Vitamin D.  Started back on Vitamin D and she felt better. She has also had prediabetes; attempts a lower sugar; lower carb diet ; BMI is noted; she also has hyperlipidemia; She needs follow up blood work on this. Prior to Admission medications    Medication Sig Start Date End Date Taking? Authorizing Provider   amLODIPine (NORVASC) 5 mg tablet TAKE 1 TABLET BY MOUTH EVERY DAY 1/22/21  Yes Calli Alonso MD   hydroCHLOROthiazide (MICROZIDE) 12.5 mg capsule Take 1 Cap by mouth daily. 8/10/20  Yes Calli Alonso MD   loratadine (CLARITIN) 10 mg tablet Take 10 mg by mouth.    Yes Provider, Historical     Patient Active Problem List    Diagnosis Date Noted    Current tear of meniscus     H/O seasonal allergies     Menopause     Osteoarthritis     Elevated LDL cholesterol level 05/22/2019    Severe obesity (BMI 35.0-35.9 with comorbidity) ([facility] [address] 05/20/2019    Injury of meniscus of right knee 05/20/2019    Right knee pain 05/20/2019    Prediabetes 05/22/2018    Mixed hyperlipidemia 05/22/2018    Family history of diabetes mellitus (DM) 04/20/2018    Morbid obesity ([facility] [address]     Hypertension 01/01/2017    Vitamin D deficiency 09/01/2010    Allergic rhinitis 08/19/2010     Allergies   Allergen Reactions    Citric Acid Rash    Cristina Hives    Red Dye Hives    Strawberry Hives     Past Medical History:   Diagnosis Date    Current tear of meniscus     R knee    H/O seasonal allergies     Hypertension 2017    Menopause     Morbid obesity ([address]     Osteoarthritis     R knee     Past Surgical History:   Procedure Laterality Date    HX DILATION AND CURETTAGE      HX TUBAL LIGATION      btl     Family History   Problem Relation Age of Onset    Breast Cancer Maternal Grandmother 46    Breast Cancer Mother 76    Hypertension Mother     Diabetes Mother     Dementia Mother 71    Heart Failure Mother     Hypertension Father     Diabetes Father     Kidney Disease Father         on HD    Heart Failure Father     Colon Cancer Neg Hx     Ovarian Cancer Neg Hx      Social History     Tobacco Use    Smoking status: Never Smoker    Smokeless tobacco: Never Used   Substance Use Topics    Alcohol use: No       Review of Systems   Constitutional: Negative for chills and fever. Cardiovascular: Negative for chest pain and palpitations. Objective:   No flowsheet data found. General: alert, cooperative, no distress   Mental  status: normal mood, behavior, speech, dress, motor activity, and thought processes, able to follow commands   HENT: NCAT   Neck: no visualized mass   Resp: no respiratory distress   Neuro: no gross deficits   Skin: no discoloration or lesions of concern on visible areas   Psychiatric: normal affect, consistent with stated mood, no evidence of hallucinations     Additional exam findings: none        We discussed the expected course, resolution and complications of the diagnosis(es) in detail. Medication risks, benefits, costs, interactions, and alternatives were discussed as indicated. I advised her to contact the office if her condition worsens, changes or fails to improve as anticipated. She expressed understanding with the diagnosis(es) and plan. Zainab Land, who was evaluated through a patient-initiated, synchronous (real-time) audio-video encounter, and/or her healthcare decision maker, is aware that it is a billable service, with coverage as determined by her insurance carrier. She provided verbal consent to proceed: Yes, and patient identification was verified. It was conducted pursuant to the emergency declaration under the [address] [place] [address] authority and the [employer] Act. A caregiver was present when appropriate. Ability to conduct physical exam was limited. I was in the office. The patient was at home.       Zenia Healy MD

## 2021-02-23 NOTE — PROGRESS NOTES
Chief Complaint   Patient presents with    Follow Up Appointment     6 months check up for high blood pressure     1. Have you been to the ER, urgent care clinic since your last visit? Hospitalized since your last visit? No    2. Have you seen or consulted any other health care providers outside of the [address] [place] [address] since your last visit? Include any pap smears or colon screening.  No

## 2021-02-26 ENCOUNTER — APPOINTMENT (OUTPATIENT)
Dept: FAMILY MEDICINE CLINIC | Age: 56
End: 2021-02-26

## 2021-02-26 DIAGNOSIS — R73.03 PREDIABETES: ICD-10-CM

## 2021-02-26 DIAGNOSIS — I10 ESSENTIAL HYPERTENSION: ICD-10-CM

## 2021-02-26 DIAGNOSIS — E78.2 MIXED HYPERLIPIDEMIA: ICD-10-CM

## 2021-02-26 DIAGNOSIS — E55.9 VITAMIN D DEFICIENCY: ICD-10-CM

## 2021-02-27 LAB
25(OH)D3+25(OH)D2 SERPL-MCNC: 19.5 NG/ML (ref 30–100)
ALBUMIN SERPL-MCNC: 4.1 G/DL (ref 3.8–4.9)
ALBUMIN/GLOB SERPL: 1.1 {RATIO} (ref 1.2–2.2)
ALP SERPL-CCNC: 121 IU/L (ref 39–117)
ALT SERPL-CCNC: 11 IU/L (ref 0–32)
AST SERPL-CCNC: 13 IU/L (ref 0–40)
BILIRUB SERPL-MCNC: 0.3 MG/DL (ref 0–1.2)
BUN SERPL-MCNC: 12 MG/DL (ref 6–24)
BUN/CREAT SERPL: 13 (ref 9–23)
CALCIUM SERPL-MCNC: 9.6 MG/DL (ref 8.7–10.2)
CHLORIDE SERPL-SCNC: 101 MMOL/L (ref 96–106)
CHOLEST SERPL-MCNC: 188 MG/DL (ref 100–199)
CO2 SERPL-SCNC: 24 MMOL/L (ref 20–29)
CREAT SERPL-MCNC: 0.96 MG/DL (ref 0.57–1)
EST. AVERAGE GLUCOSE BLD GHB EST-MCNC: 128 MG/DL
GLOBULIN SER CALC-MCNC: 3.6 G/DL (ref 1.5–4.5)
GLUCOSE SERPL-MCNC: 96 MG/DL (ref 65–99)
HBA1C MFR BLD: 6.1 % (ref 4.8–5.6)
HDLC SERPL-MCNC: 45 MG/DL
LDLC SERPL CALC-MCNC: 127 MG/DL (ref 0–99)
POTASSIUM SERPL-SCNC: 4 MMOL/L (ref 3.5–5.2)
PROT SERPL-MCNC: 7.7 G/DL (ref 6–8.5)
SODIUM SERPL-SCNC: 141 MMOL/L (ref 134–144)
TRIGL SERPL-MCNC: 87 MG/DL (ref 0–149)
VLDLC SERPL CALC-MCNC: 16 MG/DL (ref 5–40)

## 2021-02-28 RX ORDER — ERGOCALCIFEROL 1.25 MG/1
50000 CAPSULE ORAL
Qty: 12 CAP | Refills: 1 | Status: SHIPPED | OUTPATIENT
Start: 2021-02-28 | End: 2022-10-11 | Stop reason: ALTCHOICE

## 2021-02-28 NOTE — PATIENT INSTRUCTIONS
Learning About Diuretics for High Blood Pressure Overview Diuretics help to lower blood pressure. This reduces your risk of a heart attack and stroke. It also reduces your risk of kidney disease. Diuretics cause your kidneys to remove sodium and water. They also relax the blood vessel walls. These help lower your blood pressure. Examples · Chlorthalidone · Hydrochlorothiazide Possible side effects There are some common side effects. They are: · Too little potassium. · Feeling dizzy. · Rash. · Urinating a lot. · High blood sugar. (But this is not common.) You may have other side effects. Check the information that comes with your medicine. What to know about taking this medicine · You may take other medicines for blood pressure. Diuretics can help those work better. They can also prevent extra fluid in your body. · You may need to take potassium pills. Ask your doctor about this. · You may need blood tests to check on your health. For example, you may have tests to check your kidneys and your potassium level. · Take your medicines exactly as prescribed. Call your doctor if you think you are having a problem with your medicine. · Check with your doctor or pharmacist before you use any other medicines. This includes over-the-counter medicines. Make sure your doctor knows all of the medicines, vitamins, herbal products, and supplements you take. Taking some medicines together can cause problems. Where can you learn more? Go to http://www.gray.com/ Enter F047 in the search box to learn more about \"Learning About Diuretics for High Blood Pressure. \" Current as of: December 16, 2019               Content Version: 12.6 © 2312-5195 [employer]. Care instructions adapted under license by [employer] (which disclaims liability or warranty for this information). If you have questions about a medical condition or this instruction, always ask your healthcare professional. [address] any warranty or liability for your use of this information.

## 2021-08-08 RX ORDER — HYDROCHLOROTHIAZIDE 12.5 MG/1
CAPSULE ORAL
Qty: 90 CAPSULE | Refills: 3 | Status: SHIPPED | OUTPATIENT
Start: 2021-08-08 | End: 2022-07-11 | Stop reason: SDUPTHER

## 2021-08-16 RX ORDER — AMLODIPINE BESYLATE 5 MG/1
TABLET ORAL
Qty: 90 TABLET | Refills: 2 | Status: SHIPPED | OUTPATIENT
Start: 2021-08-16 | End: 2022-05-18

## 2021-10-08 ENCOUNTER — TRANSCRIBE ORDER (OUTPATIENT)
Dept: SCHEDULING | Age: 56
End: 2021-10-08

## 2021-10-08 DIAGNOSIS — Z12.31 VISIT FOR SCREENING MAMMOGRAM: Primary | ICD-10-CM

## 2021-10-29 ENCOUNTER — HOSPITAL ENCOUNTER (OUTPATIENT)
Dept: MAMMOGRAPHY | Age: 56
Discharge: HOME | End: 2021-10-29
Attending: FAMILY MEDICINE
Payer: COMMERCIAL

## 2021-10-29 DIAGNOSIS — Z12.31 VISIT FOR SCREENING MAMMOGRAM: ICD-10-CM

## 2021-10-29 PROCEDURE — 77063 BREAST TOMOSYNTHESIS BI: CPT

## 2022-03-18 PROBLEM — E66.01 SEVERE OBESITY (BMI 35.0-35.9 WITH COMORBIDITY) (HCC): Status: ACTIVE | Noted: 2019-05-20

## 2022-03-18 PROBLEM — R73.03 PREDIABETES: Status: ACTIVE | Noted: 2018-05-22

## 2022-03-19 PROBLEM — E78.2 MIXED HYPERLIPIDEMIA: Status: ACTIVE | Noted: 2018-05-22

## 2022-03-19 PROBLEM — M25.561 RIGHT KNEE PAIN: Status: ACTIVE | Noted: 2019-05-20

## 2022-03-19 PROBLEM — I10 HYPERTENSION: Status: ACTIVE | Noted: 2017-01-01

## 2022-03-19 PROBLEM — S83.8X1A INJURY OF MENISCUS OF RIGHT KNEE: Status: ACTIVE | Noted: 2019-05-20

## 2022-03-20 PROBLEM — E78.00 ELEVATED LDL CHOLESTEROL LEVEL: Status: ACTIVE | Noted: 2019-05-22

## 2022-03-20 PROBLEM — Z83.3 FAMILY HISTORY OF DIABETES MELLITUS (DM): Status: ACTIVE | Noted: 2018-04-20

## 2022-07-11 ENCOUNTER — OFFICE VISIT (OUTPATIENT)
Dept: FAMILY MEDICINE CLINIC | Age: 57
End: 2022-07-11
Payer: COMMERCIAL

## 2022-07-11 VITALS
HEART RATE: 101 BPM | HEIGHT: 61 IN | DIASTOLIC BLOOD PRESSURE: 74 MMHG | WEIGHT: 215 LBS | SYSTOLIC BLOOD PRESSURE: 126 MMHG | BODY MASS INDEX: 40.59 KG/M2 | RESPIRATION RATE: 16 BRPM | OXYGEN SATURATION: 98 % | TEMPERATURE: 98 F

## 2022-07-11 DIAGNOSIS — E66.01 OBESITY, CLASS III, BMI 40-49.9 (MORBID OBESITY) (HCC): ICD-10-CM

## 2022-07-11 DIAGNOSIS — Z13.6 SCREENING FOR CARDIOVASCULAR CONDITION: ICD-10-CM

## 2022-07-11 DIAGNOSIS — E78.2 MIXED HYPERLIPIDEMIA: ICD-10-CM

## 2022-07-11 DIAGNOSIS — R73.03 PREDIABETES: ICD-10-CM

## 2022-07-11 DIAGNOSIS — E55.9 VITAMIN D DEFICIENCY: ICD-10-CM

## 2022-07-11 DIAGNOSIS — Z11.59 NEED FOR HEPATITIS C SCREENING TEST: ICD-10-CM

## 2022-07-11 DIAGNOSIS — I10 ESSENTIAL HYPERTENSION: Primary | ICD-10-CM

## 2022-07-11 PROCEDURE — 99214 OFFICE O/P EST MOD 30 MIN: CPT | Performed by: FAMILY MEDICINE

## 2022-07-11 RX ORDER — HYDROCHLOROTHIAZIDE 12.5 MG/1
12.5 CAPSULE ORAL DAILY
Qty: 90 CAPSULE | Refills: 3 | Status: SHIPPED | OUTPATIENT
Start: 2022-07-11

## 2022-07-11 RX ORDER — AMLODIPINE BESYLATE 5 MG/1
5 TABLET ORAL DAILY
Qty: 90 TABLET | Refills: 3 | Status: SHIPPED | OUTPATIENT
Start: 2022-07-11

## 2022-07-11 NOTE — PATIENT INSTRUCTIONS
DASH Diet: Care Instructions  Your Care Instructions     The DASH diet is an eating plan that can help lower your blood pressure. DASH stands for Dietary Approaches to Stop Hypertension. Hypertension is high blood pressure. The DASH diet focuses on eating foods that are high in calcium, potassium, and magnesium. These nutrients can lower blood pressure. The foods that are highest in these nutrients are fruits, vegetables, low-fat dairy products, nuts, seeds, and legumes. But taking calcium, potassium, and magnesium supplements instead of eating foods that are high in those nutrients does not have the same effect. The DASH diet also includes whole grains, fish, and poultry. The DASH diet is one of several lifestyle changes your doctor may recommend to lower your high blood pressure. Your doctor may also want you to decrease the amount of sodium in your diet. Lowering sodium while following the DASH diet can lower blood pressure even further than just the DASH diet alone. Follow-up care is a key part of your treatment and safety. Be sure to make and go to all appointments, and call your doctor if you are having problems. It's also a good idea to know your test results and keep a list of the medicines you take. How can you care for yourself at home? Following the DASH diet  · Eat 4 to 5 servings of fruit each day. A serving is 1 medium-sized piece of fruit, ½ cup chopped or canned fruit, 1/4 cup dried fruit, or 4 ounces (½ cup) of fruit juice. Choose fruit more often than fruit juice. · Eat 4 to 5 servings of vegetables each day. A serving is 1 cup of lettuce or raw leafy vegetables, ½ cup of chopped or cooked vegetables, or 4 ounces (½ cup) of vegetable juice. Choose vegetables more often than vegetable juice. · Get 2 to 3 servings of low-fat and fat-free dairy each day. A serving is 8 ounces of milk, 1 cup of yogurt, or 1 ½ ounces of cheese. · Eat 6 to 8 servings of grains each day.  A serving is 1 slice of bread, 1 ounce of dry cereal, or ½ cup of cooked rice, pasta, or cooked cereal. Try to choose whole-grain products as much as possible. · Limit lean meat, poultry, and fish to 2 servings each day. A serving is 3 ounces, about the size of a deck of cards. · Eat 4 to 5 servings of nuts, seeds, and legumes (cooked dried beans, lentils, and split peas) each week. A serving is 1/3 cup of nuts, 2 tablespoons of seeds, or ½ cup of cooked beans or peas. · Limit fats and oils to 2 to 3 servings each day. A serving is 1 teaspoon of vegetable oil or 2 tablespoons of salad dressing. · Limit sweets and added sugars to 5 servings or less a week. A serving is 1 tablespoon jelly or jam, ½ cup sorbet, or 1 cup of lemonade. · Eat less than 2,300 milligrams (mg) of sodium a day. If you limit your sodium to 1,500 mg a day, you can lower your blood pressure even more. · Be aware that all of these are the suggested number of servings for people who eat 1,800 to 2,000 calories a day. Your recommended number of servings may be different if you need more or fewer calories. Tips for success  · Start small. Do not try to make dramatic changes to your diet all at once. You might feel that you are missing out on your favorite foods and then be more likely to not follow the plan. Make small changes, and stick with them. Once those changes become habit, add a few more changes. · Try some of the following:  ? Make it a goal to eat a fruit or vegetable at every meal and at snacks. This will make it easy to get the recommended amount of fruits and vegetables each day. ? Try yogurt topped with fruit and nuts for a snack or healthy dessert. ? Add lettuce, tomato, cucumber, and onion to sandwiches. ? Combine a ready-made pizza crust with low-fat mozzarella cheese and lots of vegetable toppings. Try using tomatoes, squash, spinach, broccoli, carrots, cauliflower, and onions. ?  Have a variety of cut-up vegetables with a low-fat dip as an appetizer instead of chips and dip. ? Sprinkle sunflower seeds or chopped almonds over salads. Or try adding chopped walnuts or almonds to cooked vegetables. ? Try some vegetarian meals using beans and peas. Add garbanzo or kidney beans to salads. Make burritos and tacos with mashed marin beans or black beans. Where can you learn more? Go to http://www.hankins.com/  Enter H967 in the search box to learn more about \"DASH Diet: Care Instructions. \"  Current as of: January 10, 2022               Content Version: 13.2  © 5958-4208 [employer]. Care instructions adapted under license by [employer] (which disclaims liability or warranty for this information). If you have questions about a medical condition or this instruction, always ask your healthcare professional. [address] any warranty or liability for your use of this information.

## 2022-07-11 NOTE — PROGRESS NOTES
1. \"Have you been to the ER, urgent care clinic since your last visit? Hospitalized since your last visit? \" No    2. \"Have you seen or consulted any other health care providers outside of the [address] [place] [address] since your last visit? \" Yes [employer] with [employer]: online travellers [employer]      3. For patients aged 39-70: Has the patient had a colonoscopy / FIT/ Cologuard? No      If the patient is female:    4. For patients aged 41-77: Has the patient had a mammogram within the past 2 years? Yes - no Care Gap present      5. For patients aged 21-65: Has the patient had a pap smear?  Yes - no Care Gap present

## 2022-07-11 NOTE — PROGRESS NOTES
HPI:  Félix Reeves is a 65 y.o. 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 She thinks this affects her back pain. Patient has high blood pressure. Her blood pressure is stable today. She is on medications as listed below. She is compliant with her medications. She needs refills. Patient is on Lipitor for her cholesterol. She is compliant with her medications. She is in need of a refill. She is also due for blood test.    Additionally patient has been found to be prediabetic and has a vitamin D deficiency. Labs will be ordered to check these levels as well. She has no other new complaints today. 3 most recent PHQ Screens 7/11/2022   PHQ Not Done Patient refuses   Little interest or pleasure in doing things Not at all   Feeling down, depressed, irritable, or hopeless Not at all   Total Score PHQ 2 0               PMH,  Meds, Allergies, Family History, Social history reviewed      Current Outpatient Medications   Medication Sig Dispense Refill    cholecalciferol, vitamin D3, (VITAMIN D3 PO) Take  by mouth.  amLODIPine (NORVASC) 5 mg tablet Take 1 Tablet by mouth daily. THIS REFILL IS  APPROVED. AN APPOINTMENT WILL NEED TO BE SCHEDULED BEFORE NEXT REFILL ORDER IS  APPROVED. 90 Tablet 0    hydroCHLOROthiazide (MICROZIDE) 12.5 mg capsule TAKE 1 CAPSULE BY MOUTH EVERY DAY 90 Capsule 3    loratadine (CLARITIN) 10 mg tablet Take 10 mg by mouth.  ergocalciferol (ERGOCALCIFEROL) 1,250 mcg (50,000 unit) capsule Take 1 Cap by mouth every seven (7) days.  (Patient not taking: Reported on 7/11/2022) 12 Cap 1        Allergies   Allergen Reactions    Citric Acid Rash    Cristina Hives    Red Dye Hives    Strawberry Hives                  ROS as per HPI      Visit Vitals  /74 (BP 1 Location: Right arm, BP Patient Position: Sitting, BP Cuff Size: Large adult)   Pulse (!) 101   Temp 98 °F (36.7 °C) (Temporal)   Resp 16   Ht 5' 1\" (1.549 m)   Wt 215 lb (97.5 kg)   SpO2 98%   BMI 40.62 kg/m²     Physical Exam   General appearance: alert, cooperative, no distress, appears stated age  Neck: supple, symmetrical, trachea midline, no adenopathy, thyroid: not enlarged, symmetric, no tenderness/mass/nodules, no carotid bruit and no JVD  Lungs: clear to auscultation bilaterally  Heart: regular rate and rhythm, S1, S2 normal, no murmur, click, rub or gallop  Extremities: extremities normal, atraumatic, no cyanosis or edema      Lab Results   Component Value Date/Time    Cholesterol, total 188 02/26/2021 09:29 AM    HDL Cholesterol 45 02/26/2021 09:29 AM    LDL, calculated 127 (H) 02/26/2021 09:29 AM    LDL, calculated 131 (H) 08/18/2020 09:09 AM    VLDL, calculated 16 02/26/2021 09:29 AM    VLDL, calculated 15 08/18/2020 09:09 AM    Triglyceride 87 02/26/2021 09:29 AM     Lab Results   Component Value Date/Time    Sodium 141 02/26/2021 09:29 AM    Potassium 4.0 02/26/2021 09:29 AM    Chloride 101 02/26/2021 09:29 AM    CO2 24 02/26/2021 09:29 AM    Anion gap 9 01/14/2019 01:40 AM    Glucose 96 02/26/2021 09:29 AM    BUN 12 02/26/2021 09:29 AM    Creatinine 0.96 02/26/2021 09:29 AM    BUN/Creatinine ratio 13 02/26/2021 09:29 AM    GFR est AA 78 02/26/2021 09:29 AM    GFR est non-AA 67 02/26/2021 09:29 AM    Calcium 9.6 02/26/2021 09:29 AM    Bilirubin, total 0.3 02/26/2021 09:29 AM    Alk. phosphatase 121 (H) 02/26/2021 09:29 AM    Protein, total 7.7 02/26/2021 09:29 AM    Albumin 4.1 02/26/2021 09:29 AM    A-G Ratio 1.1 (L) 02/26/2021 09:29 AM    ALT (SGPT) 11 02/26/2021 09:29 AM    AST (SGOT) 13 02/26/2021 09:29 AM     Lab Results   Component Value Date/Time    Hemoglobin A1c 6.1 (H) 02/26/2021 09:29 AM         Assessment/Plan:  Diagnoses and all orders for this visit:    1. Essential hypertension    2.  Vitamin D deficiency  - VITAMIN D, 25 HYDROXY; Future    3. Mixed hyperlipidemia    4. Prediabetes  -     HEMOGLOBIN A1C WITH EAG; Future    5. Need for hepatitis C screening test  -     HEPATITIS C AB; Future    6. Obesity, Class III, BMI 40-49.9 (morbid obesity) ([facility] [address]    7. Screening for cardiovascular condition  -     LIPID PANEL; Future  -     METABOLIC PANEL, COMPREHENSIVE; Future    Other orders  -     amLODIPine (NORVASC) 5 mg tablet; Take 1 Tablet by mouth daily. .  -     hydroCHLOROthiazide (MICROZIDE) 12.5 mg capsule; Take 1 Capsule by mouth daily. As above  Labs as ordered  Refilled medications that patient needed  Monitor back pain. Also discussed with patient healthy diet and exercise to maintain good health  Follow-up and Dispositions    · Return in about 3 months (around 10/11/2022) for well exam.       This has been fully explained to the patient, who indicates understanding. An After Visit Summary was printed and given to the patient.            Wendy Hernandez MD

## 2022-07-28 ENCOUNTER — LAB ONLY (OUTPATIENT)
Dept: FAMILY MEDICINE CLINIC | Age: 57
End: 2022-07-28

## 2022-07-28 DIAGNOSIS — E55.9 VITAMIN D DEFICIENCY: ICD-10-CM

## 2022-07-28 DIAGNOSIS — Z11.59 NEED FOR HEPATITIS C SCREENING TEST: ICD-10-CM

## 2022-07-28 DIAGNOSIS — Z13.6 SCREENING FOR CARDIOVASCULAR CONDITION: ICD-10-CM

## 2022-07-28 DIAGNOSIS — R73.03 PREDIABETES: ICD-10-CM

## 2022-07-29 LAB
25(OH)D3+25(OH)D2 SERPL-MCNC: 37.5 NG/ML (ref 30–100)
ALBUMIN SERPL-MCNC: 4.3 G/DL (ref 3.8–4.9)
ALBUMIN/GLOB SERPL: 1.3 {RATIO} (ref 1.2–2.2)
ALP SERPL-CCNC: 122 IU/L (ref 44–121)
ALT SERPL-CCNC: 14 IU/L (ref 0–32)
AST SERPL-CCNC: 16 IU/L (ref 0–40)
BILIRUB SERPL-MCNC: <0.2 MG/DL (ref 0–1.2)
BUN SERPL-MCNC: 14 MG/DL (ref 6–24)
BUN/CREAT SERPL: 13 (ref 9–23)
CALCIUM SERPL-MCNC: 9.6 MG/DL (ref 8.7–10.2)
CHLORIDE SERPL-SCNC: 108 MMOL/L (ref 96–106)
CHOLEST SERPL-MCNC: 183 MG/DL (ref 100–199)
CO2 SERPL-SCNC: 23 MMOL/L (ref 20–29)
CREAT SERPL-MCNC: 1.05 MG/DL (ref 0.57–1)
EGFR: 62 ML/MIN/1.73
EST. AVERAGE GLUCOSE BLD GHB EST-MCNC: 126 MG/DL
GLOBULIN SER CALC-MCNC: 3.2 G/DL (ref 1.5–4.5)
GLUCOSE SERPL-MCNC: 105 MG/DL (ref 65–99)
HBA1C MFR BLD: 6 % (ref 4.8–5.6)
HCV AB S/CO SERPL IA: <0.1 S/CO RATIO (ref 0–0.9)
HDLC SERPL-MCNC: 44 MG/DL
LDLC SERPL CALC-MCNC: 127 MG/DL (ref 0–99)
POTASSIUM SERPL-SCNC: 4.1 MMOL/L (ref 3.5–5.2)
PROT SERPL-MCNC: 7.5 G/DL (ref 6–8.5)
SODIUM SERPL-SCNC: 145 MMOL/L (ref 134–144)
TRIGL SERPL-MCNC: 66 MG/DL (ref 0–149)
VLDLC SERPL CALC-MCNC: 12 MG/DL (ref 5–40)

## 2022-10-11 ENCOUNTER — OFFICE VISIT (OUTPATIENT)
Dept: FAMILY MEDICINE CLINIC | Age: 57
End: 2022-10-11
Payer: COMMERCIAL

## 2022-10-11 ENCOUNTER — TELEPHONE (OUTPATIENT)
Dept: FAMILY MEDICINE CLINIC | Age: 57
End: 2022-10-11

## 2022-10-11 VITALS
BODY MASS INDEX: 40.44 KG/M2 | SYSTOLIC BLOOD PRESSURE: 118 MMHG | TEMPERATURE: 97.7 F | HEART RATE: 91 BPM | WEIGHT: 214.2 LBS | HEIGHT: 61 IN | OXYGEN SATURATION: 96 % | DIASTOLIC BLOOD PRESSURE: 76 MMHG

## 2022-10-11 DIAGNOSIS — E78.2 MIXED HYPERLIPIDEMIA: ICD-10-CM

## 2022-10-11 DIAGNOSIS — E55.9 VITAMIN D DEFICIENCY: ICD-10-CM

## 2022-10-11 DIAGNOSIS — R73.03 PREDIABETES: ICD-10-CM

## 2022-10-11 DIAGNOSIS — Z12.31 OTHER SCREENING MAMMOGRAM: ICD-10-CM

## 2022-10-11 DIAGNOSIS — Z00.00 WELL WOMAN EXAM (NO GYNECOLOGICAL EXAM): Primary | ICD-10-CM

## 2022-10-11 PROCEDURE — 99396 PREV VISIT EST AGE 40-64: CPT | Performed by: FAMILY MEDICINE

## 2022-10-11 NOTE — PROGRESS NOTES
1. \"Have you been to the ER, urgent care clinic since your last visit? Hospitalized since your last visit? \" No    2. \"Have you seen or consulted any other health care providers outside of the [address] [place] [address] since your last visit? \" No     3. For patients aged 39-70: Has the patient had a colonoscopy / FIT/ Cologuard? Yes - Care Gap present. Most recent result on file      If the patient is female:    4. For patients aged 41-77: Has the patient had a mammogram within the past 2 years? Yes - Care Gap present. Most recent result on file      5. For patients aged 21-65: Has the patient had a pap smear? Yes - Care Gap present.  Most recent result on file

## 2022-10-11 NOTE — PROGRESS NOTES
Subjective:   65 y.o. female for Well Woman Check. She is postmenopausal.  Social History: not sexually active. Pertinent past medical hstory: as below     Pt has been doing intermittent fasting; she is moving more. She feels well;     Needs follow-up labs and diagnostics including blood work for vitamin D and mammogram.   She also has prediabetes and will need to check labs for this as well. Wt Readings from Last 3 Encounters:   10/11/22 214 lb 3.2 oz (97.2 kg)   07/11/22 215 lb (97.5 kg)   08/10/20 213 lb 3.2 oz (96.7 kg)     . she has no new complaints     Patient Active Problem List   Diagnosis Code    Morbid obesity ([facility] [address] E66.01    Hypertension I10    Family history of diabetes mellitus (DM) Z83.3    Prediabetes R73.03    Mixed hyperlipidemia E78.2    Allergic rhinitis J30.9    Vitamin D deficiency E55.9    Severe obesity (BMI 35.0-35.9 with comorbidity) ([facility] [address] E66.01, Z68.35    Injury of meniscus of right knee S83.8X1A    Right knee pain M25.561    Elevated LDL cholesterol level E78.00    Current tear of meniscus S83.209A    H/O seasonal allergies Z88.9    Menopause Z78.0    Osteoarthritis M19.90     Current Outpatient Medications   Medication Sig Dispense Refill    cholecalciferol, vitamin D3, (VITAMIN D3 PO) Take  by mouth daily. amLODIPine (NORVASC) 5 mg tablet Take 1 Tablet by mouth daily. . 90 Tablet 3    hydroCHLOROthiazide (MICROZIDE) 12.5 mg capsule Take 1 Capsule by mouth daily. 90 Capsule 3    loratadine (CLARITIN) 10 mg tablet Take 10 mg by mouth daily as needed.        Allergies   Allergen Reactions    Citric Acid Rash    Ginger Hives    Red Dye Hives    Strawberry Hives     Past Medical History:   Diagnosis Date    Current tear of meniscus     R knee    H/O seasonal allergies     Hypertension 2017    Menopause     Morbid obesity ([facility] [address]     Osteoarthritis     R knee     Past Surgical History:   Procedure Laterality Date    HX DILATION AND CURETTAGE      HX TUBAL LIGATION      btl     Family History   Problem Relation Age of Onset    Breast Cancer Maternal Grandmother 46    Breast Cancer Mother 76    Hypertension Mother     Diabetes Mother     Dementia Mother 71    Heart Failure Mother     Hypertension Father     Diabetes Father     Kidney Disease Father         on HD    Heart Failure Father     Colon Cancer Neg Hx     Ovarian Cancer Neg Hx      Social History     Tobacco Use    Smoking status: Never    Smokeless tobacco: Never   Substance Use Topics    Alcohol use: No        ROS:  Feeling well. No dyspnea or chest pain on exertion. No abdominal pain, change in bowel habits, black or bloody stools. No urinary tract symptoms. GYN ROS: no breast pain or new or enlarging lumps on self exam. Menopausal symptoms: . No neurological complaints. Objective:   Visit Vitals  /76 (BP 1 Location: Right arm, BP Patient Position: Sitting, BP Cuff Size: Large adult)   Pulse 91   Temp 97.7 °F (36.5 °C) (Temporal)   Ht 5' 1\" (1.549 m)   Wt 214 lb 3.2 oz (97.2 kg)   SpO2 96%   BMI 40.47 kg/m²     The patient appears well, alert, oriented x 3, in no distress. ENT examined normal.  Neck supple. No adenopathy or thyromegaly. Lungs are clear, good air entry, no wheezes, rhonchi or rales. S1 and S2 normal, no murmurs, regular rate and rhythm. Abdomen soft without tenderness, guarding, mass or organomegaly. Extremities show no edema, normal peripheral pulses. Neurological is normal, no focal findings. PELVIC EXAM: not examined    Assessment/Plan:     Diagnoses and all orders for this visit:    1. Well woman exam (no gynecological exam)  Comments:  [V70.0]    2. Other screening mammogram  -     BUTCH MAMMO BI SCREENING INCL CAD; Future    3. Vitamin D deficiency  -     VITAMIN D, 25 HYDROXY; Future    4. Mixed hyperlipidemia  -     LIPID PANEL; Future  -     METABOLIC PANEL, COMPREHENSIVE; Future    5. Prediabetes  -     HEMOGLOBIN A1C WITH EAG;  Future    As above   Patient stable  Labs as ordered  Follow-up and Dispositions    Return in about 4 months (around 2/11/2023). This has been fully explained to the patient, who indicates understanding. An After Visit Summary was printed and given to the patient.

## 2022-10-22 ENCOUNTER — HOSPITAL ENCOUNTER (OUTPATIENT)
Dept: LAB | Age: 57
Discharge: HOME | End: 2022-10-22

## 2022-10-22 LAB — XX-LABCORP SPECIMEN COL,LCBCF: NORMAL

## 2022-10-22 PROCEDURE — 99001 SPECIMEN HANDLING PT-LAB: CPT

## 2022-10-24 LAB
25(OH)D3+25(OH)D2 SERPL-MCNC: 35.3 NG/ML (ref 30–100)
ALBUMIN SERPL-MCNC: 4.4 G/DL (ref 3.8–4.9)
ALBUMIN/GLOB SERPL: 1.3 {RATIO} (ref 1.2–2.2)
ALP SERPL-CCNC: 127 IU/L (ref 44–121)
ALT SERPL-CCNC: 12 IU/L (ref 0–32)
AST SERPL-CCNC: 15 IU/L (ref 0–40)
BILIRUB SERPL-MCNC: 0.4 MG/DL (ref 0–1.2)
BUN SERPL-MCNC: 13 MG/DL (ref 6–24)
BUN/CREAT SERPL: 15 (ref 9–23)
CALCIUM SERPL-MCNC: 10 MG/DL (ref 8.7–10.2)
CHLORIDE SERPL-SCNC: 100 MMOL/L (ref 96–106)
CHOLEST SERPL-MCNC: 195 MG/DL (ref 100–199)
CO2 SERPL-SCNC: 20 MMOL/L (ref 20–29)
CREAT SERPL-MCNC: 0.88 MG/DL (ref 0.57–1)
EGFR: 77 ML/MIN/1.73
EST. AVERAGE GLUCOSE BLD GHB EST-MCNC: 123 MG/DL
GLOBULIN SER CALC-MCNC: 3.3 G/DL (ref 1.5–4.5)
GLUCOSE SERPL-MCNC: ABNORMAL MG/DL
HBA1C MFR BLD: 5.9 % (ref 4.8–5.6)
HDLC SERPL-MCNC: 46 MG/DL
LDLC SERPL CALC-MCNC: 134 MG/DL (ref 0–99)
POTASSIUM SERPL-SCNC: ABNORMAL MMOL/L
PROT SERPL-MCNC: 7.7 G/DL (ref 6–8.5)
SODIUM SERPL-SCNC: 141 MMOL/L (ref 134–144)
TRIGL SERPL-MCNC: 81 MG/DL (ref 0–149)
VLDLC SERPL CALC-MCNC: 15 MG/DL (ref 5–40)

## 2022-11-02 ENCOUNTER — HOSPITAL ENCOUNTER (OUTPATIENT)
Dept: MAMMOGRAPHY | Age: 57
Discharge: HOME | End: 2022-11-02
Attending: FAMILY MEDICINE
Payer: COMMERCIAL

## 2022-11-02 DIAGNOSIS — Z12.31 OTHER SCREENING MAMMOGRAM: ICD-10-CM

## 2022-11-02 PROCEDURE — 77063 BREAST TOMOSYNTHESIS BI: CPT

## 2023-02-13 ENCOUNTER — OFFICE VISIT (OUTPATIENT)
Age: 58
End: 2023-02-13
Payer: COMMERCIAL

## 2023-02-13 VITALS
RESPIRATION RATE: 18 BRPM | BODY MASS INDEX: 38.82 KG/M2 | SYSTOLIC BLOOD PRESSURE: 120 MMHG | WEIGHT: 205.6 LBS | HEART RATE: 89 BPM | HEIGHT: 61 IN | TEMPERATURE: 97.5 F | OXYGEN SATURATION: 97 % | DIASTOLIC BLOOD PRESSURE: 78 MMHG

## 2023-02-13 DIAGNOSIS — E78.2 MIXED HYPERLIPIDEMIA: ICD-10-CM

## 2023-02-13 DIAGNOSIS — I10 ESSENTIAL (PRIMARY) HYPERTENSION: Primary | ICD-10-CM

## 2023-02-13 DIAGNOSIS — R73.03 PREDIABETES: ICD-10-CM

## 2023-02-13 DIAGNOSIS — E55.9 VITAMIN D DEFICIENCY, UNSPECIFIED: ICD-10-CM

## 2023-02-13 DIAGNOSIS — E66.01 SEVERE OBESITY (BMI 35.0-39.9) WITH COMORBIDITY (HCC): ICD-10-CM

## 2023-02-13 PROCEDURE — 3074F SYST BP LT 130 MM HG: CPT | Performed by: FAMILY MEDICINE

## 2023-02-13 PROCEDURE — 99214 OFFICE O/P EST MOD 30 MIN: CPT | Performed by: FAMILY MEDICINE

## 2023-02-13 PROCEDURE — 3078F DIAST BP <80 MM HG: CPT | Performed by: FAMILY MEDICINE

## 2023-02-13 RX ORDER — HYDROCHLOROTHIAZIDE 12.5 MG/1
12.5 CAPSULE, GELATIN COATED ORAL DAILY
COMMUNITY
Start: 2022-07-11

## 2023-02-13 RX ORDER — AMLODIPINE BESYLATE 10 MG/1
5 TABLET ORAL DAILY
COMMUNITY

## 2023-02-13 RX ORDER — LORATADINE 10 MG/1
10 TABLET ORAL DAILY PRN
COMMUNITY

## 2023-02-13 SDOH — ECONOMIC STABILITY: FOOD INSECURITY: WITHIN THE PAST 12 MONTHS, YOU WORRIED THAT YOUR FOOD WOULD RUN OUT BEFORE YOU GOT MONEY TO BUY MORE.: NEVER TRUE

## 2023-02-13 SDOH — ECONOMIC STABILITY: INCOME INSECURITY: HOW HARD IS IT FOR YOU TO PAY FOR THE VERY BASICS LIKE FOOD, HOUSING, MEDICAL CARE, AND HEATING?: NOT VERY HARD

## 2023-02-13 ASSESSMENT — PATIENT HEALTH QUESTIONNAIRE - PHQ9
SUM OF ALL RESPONSES TO PHQ QUESTIONS 1-9: 15
10. IF YOU CHECKED OFF ANY PROBLEMS, HOW DIFFICULT HAVE THESE PROBLEMS MADE IT FOR YOU TO DO YOUR WORK, TAKE CARE OF THINGS AT HOME, OR GET ALONG WITH OTHER PEOPLE: 2
3. TROUBLE FALLING OR STAYING ASLEEP: 2
2. FEELING DOWN, DEPRESSED OR HOPELESS: 2
8. MOVING OR SPEAKING SO SLOWLY THAT OTHER PEOPLE COULD HAVE NOTICED. OR THE OPPOSITE, BEING SO FIGETY OR RESTLESS THAT YOU HAVE BEEN MOVING AROUND A LOT MORE THAN USUAL: 0
6. FEELING BAD ABOUT YOURSELF - OR THAT YOU ARE A FAILURE OR HAVE LET YOURSELF OR YOUR FAMILY DOWN: 0
4. FEELING TIRED OR HAVING LITTLE ENERGY: 3
SUM OF ALL RESPONSES TO PHQ QUESTIONS 1-9: 15
1. LITTLE INTEREST OR PLEASURE IN DOING THINGS: 3
SUM OF ALL RESPONSES TO PHQ QUESTIONS 1-9: 15
5. POOR APPETITE OR OVEREATING: 3
SUM OF ALL RESPONSES TO PHQ9 QUESTIONS 1 & 2: 5
9. THOUGHTS THAT YOU WOULD BE BETTER OFF DEAD, OR OF HURTING YOURSELF: 0
SUM OF ALL RESPONSES TO PHQ QUESTIONS 1-9: 15
7. TROUBLE CONCENTRATING ON THINGS, SUCH AS READING THE NEWSPAPER OR WATCHING TELEVISION: 2

## 2023-02-13 NOTE — PROGRESS NOTES
HPI:  William Shay is a 65 y.o. female who presents today with   Chief Complaint   Patient presents with    Blood Sugar Problem    Cholesterol Problem      Patient's mother has recently . 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 She is in bereavement but she states she is doing okay. Her appetite has been blunted and she has lost weight. She had labs done last October. These are as noted below. Her current BMI is as noted. Patient also has a history of prediabetes and vitamin D deficiency. These values will also be rechecked. She has been off work. She is planning to return full-time soon. Is on medications as listed below. Patient has hypercholesterolemia. She manages this with diet and exercise.     Lab Results   Component Value Date    CHOL 195 10/22/2022    CHOL 183 2022    CHOL 188 2021     Lab Results   Component Value Date    TRIG 81 10/22/2022    TRIG 66 2022    TRIG 87 2021     Lab Results   Component Value Date    HDL 46 10/22/2022    HDL 44 2022    HDL 45 2021     Lab Results   Component Value Date    LDLCALC 134 (H) 10/22/2022    LDLCALC 127 (H) 2022    LDLCALC 127 (H) 2021     Lab Results   Component Value Date    LABVLDL 15 2020    LABVLDL 17 2019    VLDL 15 10/22/2022    VLDL 12 2022    VLDL 16 2021     Lab Results   Component Value Date     10/22/2022    K CANCELED 10/22/2022     10/22/2022    CO2 20 10/22/2022    BUN 13 10/22/2022    CREATININE 0.88 10/22/2022    GLUCOSE CANCELED 10/22/2022    CALCIUM 10.0 10/22/2022    PROT 7.7 10/22/2022    LABALBU 4.4 10/22/2022    BILITOT 0.4 10/22/2022    ALKPHOS 127 (H) 10/22/2022    AST 15 10/22/2022    ALT 12 10/22/2022    LABGLOM 77 10/22/2022    GFRAA 78 2021    AGRATIO 1.3 10/22/2022         Hemoglobin A1C   Date Value Ref Range Status   10/22/2022 5.9 (H) 4.8 - 5.6 % Final     Comment:              Prediabetes: 5.7 - 6.4           Diabetes: >6.4           Glycemic control for adults with diabetes: <7.0             Wt Readings from Last 3 Encounters:   02/13/23 205 lb 9.6 oz (93.3 kg)   10/11/22 214 lb 3.2 oz (97.2 kg)   07/11/22 215 lb (97.5 kg)           No flowsheet data found. PMH,  Meds, Allergies, Family History, Social history reviewed      Current Outpatient Medications   Medication Sig Dispense Refill    Cholecalciferol (VITAMIN D3 PO) Take by mouth daily      amLODIPine (NORVASC) 10 MG tablet Take 5 mg by mouth daily      hydroCHLOROthiazide (MICROZIDE) 12.5 MG capsule Take 12.5 mg by mouth daily      loratadine (CLARITIN) 10 MG tablet Take 10 mg by mouth daily as needed       No current facility-administered medications for this visit. Allergies   Allergen Reactions    Ginger Hives    Red Dye Hives    Strawberry Hives    Citric Acid Rash                  Review of Systems as per HPI        /78 (Site: Left Upper Arm, Position: Sitting, Cuff Size: Medium Adult)   Pulse 89   Temp 97.5 °F (36.4 °C)   Resp 18   Ht 5' 1\" (1.549 m)   Wt 205 lb 9.6 oz (93.3 kg)   LMP  (LMP Unknown) Comment: [place]  SpO2 97%   BMI 38.85 kg/m²   General appearance: alert, cooperative, no distress, appears stated age  Neck: supple, symmetrical, trachea midline, no adenopathy, thyroid: not enlarged, symmetric, no tenderness/mass/nodules, no carotid bruit and no JVD  Lungs: clear to auscultation bilaterally  Heart: regular rate and rhythm, S1, S2 normal, no murmur, click, rub or gallop    Extremities: extremities normal, atraumatic, no cyanosis or edema      Assessment/Plan:  Aemlia Tineo was seen today for blood sugar problem and cholesterol problem. Diagnoses and all orders for this visit:    Essential (primary) hypertension  -     CBC with Auto Differential; Future  -     Comprehensive Metabolic Panel;  Future  -     Comprehensive Metabolic Panel  -     CBC with Auto Differential    Mixed hyperlipidemia  - Lipid Panel; Future  -     Lipid Panel    Vitamin D deficiency, unspecified  -     Vitamin D 25 Hydroxy; Future  -     Vitamin D 25 Hydroxy    Prediabetes  -     Hemoglobin A1C; Future  -     Hemoglobin A1C    Severe obesity (BMI 35.0-39. 9) with comorbidity ([address]        As above  Patient stable  Patient encouraged and supported with regards to the death of her mother  Patient to continue her weight loss efforts with diet and exercise  Labs as ordered  Return in about 6 months (around 8/13/2023) for well woman. This has been fully explained to the patient, who indicates understanding. [facility] and pt has been advised of same.      Carol Holland MD

## 2023-02-13 NOTE — PROGRESS NOTES
1. \"Have you been to the ER, urgent care clinic since your last visit? Hospitalized since your last visit? \" No    2. \"Have you seen or consulted any other health care providers outside of the [address] [place] [address] since your last visit? \"  Yes, OBGYN    3. For patients aged 39-70: Has the patient had a colonoscopy / FIT/ Cologuard? No,       If the patient is femes,   4. For patients aged 41-77: Has the patient had a mammogram within the past 2 years? Yes - Care Gap present. Rooming MA/LPN to request most recent results      5. For patients aged 21-65: Has the patient had a pap smear? Yes - Care Gap present.  Rooming MA/LPN to request most recent results

## 2023-02-14 LAB
25(OH)D3+25(OH)D2 SERPL-MCNC: 28.8 NG/ML (ref 30–100)
ALBUMIN SERPL-MCNC: 4.4 G/DL (ref 3.8–4.9)
ALBUMIN/GLOB SERPL: 1.5 {RATIO} (ref 1.2–2.2)
ALP SERPL-CCNC: 121 IU/L (ref 44–121)
ALT SERPL-CCNC: 10 IU/L (ref 0–32)
AST SERPL-CCNC: 12 IU/L (ref 0–40)
BASOPHILS # BLD AUTO: 0 X10E3/UL (ref 0–0.2)
BASOPHILS NFR BLD AUTO: 1 %
BILIRUB SERPL-MCNC: 0.3 MG/DL (ref 0–1.2)
BUN SERPL-MCNC: 14 MG/DL (ref 6–24)
BUN/CREAT SERPL: 15 (ref 9–23)
CALCIUM SERPL-MCNC: 9.8 MG/DL (ref 8.7–10.2)
CHLORIDE SERPL-SCNC: 102 MMOL/L (ref 96–106)
CHOLEST SERPL-MCNC: 186 MG/DL (ref 100–199)
CO2 SERPL-SCNC: 21 MMOL/L (ref 20–29)
CREAT SERPL-MCNC: 0.92 MG/DL (ref 0.57–1)
EGFRCR SERPLBLD CKD-EPI 2021: 73 ML/MIN/1.73
EOSINOPHIL # BLD AUTO: 0 X10E3/UL (ref 0–0.4)
EOSINOPHIL NFR BLD AUTO: 1 %
ERYTHROCYTE [DISTWIDTH] IN BLOOD BY AUTOMATED COUNT: 14.8 % (ref 11.7–15.4)
GLOBULIN SER CALC-MCNC: 3 G/DL (ref 1.5–4.5)
GLUCOSE SERPL-MCNC: 88 MG/DL (ref 70–99)
HBA1C MFR BLD: 6 % (ref 4.8–5.6)
HCT VFR BLD AUTO: 37.8 % (ref 34–46.6)
HDLC SERPL-MCNC: 42 MG/DL
HGB BLD-MCNC: 12.4 G/DL (ref 11.1–15.9)
IMM GRANULOCYTES # BLD AUTO: 0 X10E3/UL (ref 0–0.1)
IMM GRANULOCYTES NFR BLD AUTO: 0 %
LDLC SERPL CALC-MCNC: 128 MG/DL (ref 0–99)
LYMPHOCYTES # BLD AUTO: 2.8 X10E3/UL (ref 0.7–3.1)
LYMPHOCYTES NFR BLD AUTO: 31 %
MCH RBC QN AUTO: 25.3 PG (ref 26.6–33)
MCHC RBC AUTO-ENTMCNC: 32.8 G/DL (ref 31.5–35.7)
MCV RBC AUTO: 77 FL (ref 79–97)
MONOCYTES # BLD AUTO: 0.5 X10E3/UL (ref 0.1–0.9)
MONOCYTES NFR BLD AUTO: 5 %
NEUTROPHILS # BLD AUTO: 5.5 X10E3/UL (ref 1.4–7)
NEUTROPHILS NFR BLD AUTO: 62 %
PLATELET # BLD AUTO: 417 X10E3/UL (ref 150–450)
POTASSIUM SERPL-SCNC: 4.3 MMOL/L (ref 3.5–5.2)
PROT SERPL-MCNC: 7.4 G/DL (ref 6–8.5)
RBC # BLD AUTO: 4.91 X10E6/UL (ref 3.77–5.28)
SODIUM SERPL-SCNC: 140 MMOL/L (ref 134–144)
TRIGL SERPL-MCNC: 84 MG/DL (ref 0–149)
VLDLC SERPL CALC-MCNC: 16 MG/DL (ref 5–40)
WBC # BLD AUTO: 8.9 X10E3/UL (ref 3.4–10.8)

## 2023-02-24 ENCOUNTER — TELEPHONE (OUTPATIENT)
Age: 58
End: 2023-02-24

## 2023-03-31 ENCOUNTER — TELEPHONE (OUTPATIENT)
Age: 58
End: 2023-03-31

## 2023-03-31 NOTE — TELEPHONE ENCOUNTER
Pt called [facility] that has been waiting refills for her Amlodipine and Hydrochlorothiazide that was to sent to the [facility] on [address] back during appt on 2/13, [facility] has been dealing with the passing of her mother and had not had time to call.

## 2023-03-31 NOTE — TELEPHONE ENCOUNTER
Medications last filled on 7-    # 90 caps/tabs with 3 refills for both. Has refills left in pharmacy as can see on medication dispense history last picked it up on 7- and has 3 refills left. Called to let know, no answer. Left message.

## 2023-08-14 ENCOUNTER — OFFICE VISIT (OUTPATIENT)
Age: 58
End: 2023-08-14
Payer: COMMERCIAL

## 2023-08-14 VITALS
HEIGHT: 61 IN | OXYGEN SATURATION: 98 % | SYSTOLIC BLOOD PRESSURE: 125 MMHG | BODY MASS INDEX: 41.27 KG/M2 | DIASTOLIC BLOOD PRESSURE: 85 MMHG | HEART RATE: 96 BPM | WEIGHT: 218.6 LBS | RESPIRATION RATE: 18 BRPM | TEMPERATURE: 97.3 F

## 2023-08-14 DIAGNOSIS — E78.2 MIXED HYPERLIPIDEMIA: ICD-10-CM

## 2023-08-14 DIAGNOSIS — I10 ESSENTIAL (PRIMARY) HYPERTENSION: Primary | ICD-10-CM

## 2023-08-14 DIAGNOSIS — R73.03 PREDIABETES: ICD-10-CM

## 2023-08-14 DIAGNOSIS — E55.9 VITAMIN D DEFICIENCY, UNSPECIFIED: ICD-10-CM

## 2023-08-14 DIAGNOSIS — Z12.11 SCREENING FOR COLON CANCER: ICD-10-CM

## 2023-08-14 PROCEDURE — 99396 PREV VISIT EST AGE 40-64: CPT | Performed by: FAMILY MEDICINE

## 2023-08-14 PROCEDURE — 3078F DIAST BP <80 MM HG: CPT | Performed by: FAMILY MEDICINE

## 2023-08-14 PROCEDURE — 3074F SYST BP LT 130 MM HG: CPT | Performed by: FAMILY MEDICINE

## 2023-08-14 RX ORDER — IBUPROFEN 800 MG/1
800 TABLET ORAL EVERY 6 HOURS PRN
COMMUNITY
Start: 2017-04-22

## 2023-08-14 SDOH — ECONOMIC STABILITY: HOUSING INSECURITY
IN THE LAST 12 MONTHS, WAS THERE A TIME WHEN YOU DID NOT HAVE A STEADY PLACE TO SLEEP OR SLEPT IN A SHELTER (INCLUDING NOW)?: NO

## 2023-08-14 SDOH — ECONOMIC STABILITY: INCOME INSECURITY: HOW HARD IS IT FOR YOU TO PAY FOR THE VERY BASICS LIKE FOOD, HOUSING, MEDICAL CARE, AND HEATING?: NOT VERY HARD

## 2023-08-14 SDOH — ECONOMIC STABILITY: FOOD INSECURITY: WITHIN THE PAST 12 MONTHS, YOU WORRIED THAT YOUR FOOD WOULD RUN OUT BEFORE YOU GOT MONEY TO BUY MORE.: NEVER TRUE

## 2023-08-14 SDOH — ECONOMIC STABILITY: FOOD INSECURITY: WITHIN THE PAST 12 MONTHS, THE FOOD YOU BOUGHT JUST DIDN'T LAST AND YOU DIDN'T HAVE MONEY TO GET MORE.: NEVER TRUE

## 2023-08-14 ASSESSMENT — PATIENT HEALTH QUESTIONNAIRE - PHQ9
SUM OF ALL RESPONSES TO PHQ QUESTIONS 1-9: 0
1. LITTLE INTEREST OR PLEASURE IN DOING THINGS: 0
SUM OF ALL RESPONSES TO PHQ QUESTIONS 1-9: 0
SUM OF ALL RESPONSES TO PHQ QUESTIONS 1-9: 0
2. FEELING DOWN, DEPRESSED OR HOPELESS: 0
SUM OF ALL RESPONSES TO PHQ9 QUESTIONS 1 & 2: 0
SUM OF ALL RESPONSES TO PHQ QUESTIONS 1-9: 0

## 2023-08-14 ASSESSMENT — ANXIETY QUESTIONNAIRES
IF YOU CHECKED OFF ANY PROBLEMS ON THIS QUESTIONNAIRE, HOW DIFFICULT HAVE THESE PROBLEMS MADE IT FOR YOU TO DO YOUR WORK, TAKE CARE OF THINGS AT HOME, OR GET ALONG WITH OTHER PEOPLE: NOT DIFFICULT AT ALL
7. FEELING AFRAID AS IF SOMETHING AWFUL MIGHT HAPPEN: 0
6. BECOMING EASILY ANNOYED OR IRRITABLE: 0
2. NOT BEING ABLE TO STOP OR CONTROL WORRYING: 0
5. BEING SO RESTLESS THAT IT IS HARD TO SIT STILL: 0
4. TROUBLE RELAXING: 0
GAD7 TOTAL SCORE: 0
3. WORRYING TOO MUCH ABOUT DIFFERENT THINGS: 0
1. FEELING NERVOUS, ANXIOUS, OR ON EDGE: 0

## 2023-08-14 NOTE — PROGRESS NOTES
1. \"Have you been to the ER, urgent care clinic since your last visit? Hospitalized since your last visit? \" No    2. \"Have you seen or consulted any other health care providers outside of the [address] [place] [address] since your last visit? \" No     3. For patients aged 43-73: Has the patient had a colonoscopy / FIT/ Cologuard? No      If the patient is female:    4. For patients aged 43-66: Has the patient had a mammogram within the past 2 years? Yes - Care Gap present. Most recent result on file      5. For patients aged 21-65: Has the patient had a pap smear? Yes - Care Gap present.  Rooming MA/LPN to request most recent results

## 2023-08-14 NOTE — PROGRESS NOTES
Well Adult Note  Name: Fabiana Hernandez Date: 2023   MRN: [id number] Sex: Female   Age: 63 y.o. Ethnicity: Non- / Non    : 1965 Race: Hernan Lo / Lupe Klein is here for well adult exam.      Has gained some weight back  Has not been eating  as well as previous due to her mom's death  . Patient plans to start diet once again and began exercising. Needs cologuard order    She is due for blood work for her chronic conditions. Social patient  Hemoglobin A1C   Date Value Ref Range Status   2023 6.0 (H) 4.8 - 5.6 % Final     Comment:              Prediabetes: 5.7 - 6.4           Diabetes: >6.4           Glycemic control for adults with diabetes: <7.0       Wt Readings from Last 3 Encounters:   23 218 lb 9.6 oz (99.2 kg)   23 205 lb 9.6 oz (93.3 kg)   10/11/22 214 lb 3.2 oz (97.2 kg)               Review of Systems as per HPI    Allergies   Allergen Reactions    Ginger Hives    Red Dye Hives    Strawberry Hives    Citric Acid Rash         Prior to Visit Medications    Medication Sig Taking?  Authorizing Provider   ibuprofen (ADVIL;MOTRIN) 800 MG tablet Take 1 tablet by mouth every 6 hours as needed Yes Historical Provider, MD   amLODIPine (NORVASC) 5 MG tablet Take 1 tablet by mouth daily Yes Adriana Rodrigues MD   hydroCHLOROthiazide (MICROZIDE) 12.5 MG capsule Take 1 capsule by mouth daily Yes Adriana Rodrigues MD   Cholecalciferol (VITAMIN D3 PO) Take by mouth daily Yes Historical Provider, MD   loratadine (CLARITIN) 10 MG tablet Take 1 tablet by mouth daily as needed Yes Historical Provider, MD         Past Medical History:   Diagnosis Date    Current tear of meniscus     R knee    H/O seasonal allergies     Hypertension 2017    Menopause     Morbid obesity ([address])     Osteoarthritis     R knee       Past Surgical History:   Procedure Laterality Date    DILATION AND CURETTAGE OF UTERUS      TUBAL LIGATION      btl         Family History

## 2023-09-07 ENCOUNTER — HOSPITAL ENCOUNTER (OUTPATIENT)
Facility: HOSPITAL | Age: 58
Discharge: HOME | End: 2023-09-10

## 2023-09-07 LAB — LABCORP SPECIMEN COLLECTION: NORMAL

## 2023-09-08 LAB
25(OH)D3+25(OH)D2 SERPL-MCNC: 20.6 NG/ML (ref 30–100)
ALBUMIN SERPL-MCNC: 4.5 G/DL (ref 3.8–4.9)
ALBUMIN/GLOB SERPL: 1.4 {RATIO} (ref 1.2–2.2)
ALP SERPL-CCNC: 138 IU/L (ref 44–121)
ALT SERPL-CCNC: 11 IU/L (ref 0–32)
AST SERPL-CCNC: 12 IU/L (ref 0–40)
BILIRUB SERPL-MCNC: 0.3 MG/DL (ref 0–1.2)
BUN SERPL-MCNC: 11 MG/DL (ref 6–24)
BUN/CREAT SERPL: 11 (ref 9–23)
CALCIUM SERPL-MCNC: 10.1 MG/DL (ref 8.7–10.2)
CHLORIDE SERPL-SCNC: 103 MMOL/L (ref 96–106)
CHOLEST SERPL-MCNC: 215 MG/DL (ref 100–199)
CO2 SERPL-SCNC: 21 MMOL/L (ref 20–29)
CREAT SERPL-MCNC: 0.99 MG/DL (ref 0.57–1)
EGFRCR SERPLBLD CKD-EPI 2021: 67 ML/MIN/1.73
GLOBULIN SER CALC-MCNC: 3.2 G/DL (ref 1.5–4.5)
GLUCOSE SERPL-MCNC: 88 MG/DL (ref 70–99)
HBA1C MFR BLD: 6 % (ref 4.8–5.6)
HDLC SERPL-MCNC: 47 MG/DL
LDLC SERPL CALC-MCNC: 149 MG/DL (ref 0–99)
POTASSIUM SERPL-SCNC: 4.4 MMOL/L (ref 3.5–5.2)
PROT SERPL-MCNC: 7.7 G/DL (ref 6–8.5)
SODIUM SERPL-SCNC: 142 MMOL/L (ref 134–144)
SPECIMEN STATUS REPORT: NORMAL
TRIGL SERPL-MCNC: 106 MG/DL (ref 0–149)
TSH SERPL DL<=0.005 MIU/L-ACNC: 1.27 UIU/ML (ref 0.45–4.5)
VLDLC SERPL CALC-MCNC: 19 MG/DL (ref 5–40)

## 2023-11-03 ENCOUNTER — HOSPITAL ENCOUNTER (OUTPATIENT)
Facility: HOSPITAL | Age: 58
Discharge: HOME | End: 2023-11-03
Attending: FAMILY MEDICINE
Payer: COMMERCIAL

## 2023-11-03 VITALS — BODY MASS INDEX: 37.76 KG/M2 | HEIGHT: 61 IN | WEIGHT: 200 LBS

## 2023-11-03 DIAGNOSIS — Z12.31 VISIT FOR SCREENING MAMMOGRAM: ICD-10-CM

## 2023-11-03 PROCEDURE — 77063 BREAST TOMOSYNTHESIS BI: CPT

## 2023-12-14 ENCOUNTER — TELEPHONE (OUTPATIENT)
Age: 58
End: 2023-12-14

## 2023-12-14 NOTE — TELEPHONE ENCOUNTER
----- Message from Chrissy Taylor sent at 12/14/2023 11:06 AM EST -----  Subject: Message to Provider    QUESTIONS  Information for Provider? pt missed a phone call from DR melvin. She   would like a call back please.   ---------------------------------------------------------------------------  --------------  CALL BACK INFO  [phone]; OK to leave message on voicemail  ---------------------------------------------------------------------------  --------------  SCRIPT ANSWERS  undefined

## 2024-11-01 ENCOUNTER — TRANSCRIBE ORDERS (OUTPATIENT)
Facility: HOSPITAL | Age: 59
End: 2024-11-01

## 2024-11-01 DIAGNOSIS — Z12.31 SCREENING MAMMOGRAM, ENCOUNTER FOR: Primary | ICD-10-CM

## 2024-12-12 ENCOUNTER — HOSPITAL ENCOUNTER (OUTPATIENT)
Facility: HOSPITAL | Age: 59
Discharge: HOME | End: 2024-12-15
Payer: COMMERCIAL

## 2024-12-12 VITALS — WEIGHT: 199.96 LBS | BODY MASS INDEX: 37.75 KG/M2 | HEIGHT: 61 IN

## 2024-12-12 DIAGNOSIS — Z12.31 SCREENING MAMMOGRAM, ENCOUNTER FOR: ICD-10-CM

## 2024-12-12 PROCEDURE — 77063 BREAST TOMOSYNTHESIS BI: CPT

## 2025-08-20 ENCOUNTER — TRANSCRIBE ORDERS (OUTPATIENT)
Facility: HOSPITAL | Age: 60
End: 2025-08-20

## 2025-08-20 DIAGNOSIS — R10.811 RIGHT UPPER QUADRANT ABDOMINAL TENDERNESS, REBOUND TENDERNESS PRESENCE NOT SPECIFIED: ICD-10-CM

## 2025-08-20 DIAGNOSIS — Z12.11: ICD-10-CM

## 2025-08-20 DIAGNOSIS — R10.11 RUQ DISCOMFORT: Primary | ICD-10-CM

## 2025-08-20 DIAGNOSIS — R10.811 RIGHT UPPER QUADRANT ABDOMINAL TENDERNESS, REBOUND TENDERNESS PRESENCE NOT SPECIFIED: Primary | ICD-10-CM

## 2025-08-20 DIAGNOSIS — E66.813 OBESITY, CLASS III, BMI 40-49.9 (MORBID OBESITY) (HCC): ICD-10-CM

## 2025-08-20 DIAGNOSIS — Z12.11 SCREEN FOR COLON CANCER: ICD-10-CM

## 2025-08-20 DIAGNOSIS — R10.11 ABDOMINAL PAIN, RIGHT UPPER QUADRANT: ICD-10-CM

## 2025-08-29 ENCOUNTER — HOSPITAL ENCOUNTER (OUTPATIENT)
Facility: HOSPITAL | Age: 60
Discharge: HOME | End: 2025-09-01
Payer: COMMERCIAL

## 2025-08-29 DIAGNOSIS — E66.813 OBESITY, CLASS III, BMI 40-49.9 (MORBID OBESITY) (HCC): ICD-10-CM

## 2025-08-29 DIAGNOSIS — Z12.11 SCREEN FOR COLON CANCER: ICD-10-CM

## 2025-08-29 DIAGNOSIS — R10.11 ABDOMINAL PAIN, RIGHT UPPER QUADRANT: ICD-10-CM

## 2025-08-29 DIAGNOSIS — Z12.11: ICD-10-CM

## 2025-08-29 DIAGNOSIS — R10.811 RIGHT UPPER QUADRANT ABDOMINAL TENDERNESS, REBOUND TENDERNESS PRESENCE NOT SPECIFIED: ICD-10-CM

## 2025-08-29 DIAGNOSIS — R10.11 RUQ DISCOMFORT: ICD-10-CM

## 2025-08-29 PROCEDURE — 3430000000 HC RX DIAGNOSTIC RADIOPHARMACEUTICAL: Performed by: PHYSICIAN ASSISTANT

## 2025-08-29 PROCEDURE — 76705 ECHO EXAM OF ABDOMEN: CPT

## 2025-08-29 PROCEDURE — 78227 HEPATOBIL SYST IMAGE W/DRUG: CPT

## 2025-08-29 PROCEDURE — A9537 TC99M MEBROFENIN: HCPCS | Performed by: PHYSICIAN ASSISTANT

## 2025-08-29 PROCEDURE — 2580000003 HC RX 258: Performed by: PHYSICIAN ASSISTANT

## 2025-08-29 PROCEDURE — 6360000004 HC RX CONTRAST MEDICATION: Performed by: PHYSICIAN ASSISTANT

## 2025-08-29 PROCEDURE — 2500000003 HC RX 250 WO HCPCS: Performed by: PHYSICIAN ASSISTANT

## 2025-08-29 RX ORDER — SODIUM CHLORIDE 9 MG/ML
INJECTION, SOLUTION INTRAVENOUS ONCE
Status: COMPLETED | OUTPATIENT
Start: 2025-08-29 | End: 2025-08-29

## 2025-08-29 RX ORDER — KIT FOR THE PREPARATION OF TECHNETIUM TC 99M MEBROFENIN 45 MG/10ML
6.6 INJECTION, POWDER, LYOPHILIZED, FOR SOLUTION INTRAVENOUS
Status: COMPLETED | OUTPATIENT
Start: 2025-08-29 | End: 2025-08-29

## 2025-08-29 RX ADMIN — SINCALIDE 1.97 MCG: 5 INJECTION, POWDER, LYOPHILIZED, FOR SOLUTION INTRAVENOUS at 10:28

## 2025-08-29 RX ADMIN — SODIUM CHLORIDE: 9 INJECTION, SOLUTION INTRAVENOUS at 10:27

## 2025-08-29 RX ADMIN — MEBROFENIN 6.6 MILLICURIE: 45 INJECTION, POWDER, LYOPHILIZED, FOR SOLUTION INTRAVENOUS at 10:28
